# Patient Record
Sex: FEMALE | NOT HISPANIC OR LATINO | Employment: UNEMPLOYED | URBAN - METROPOLITAN AREA
[De-identification: names, ages, dates, MRNs, and addresses within clinical notes are randomized per-mention and may not be internally consistent; named-entity substitution may affect disease eponyms.]

---

## 2020-01-01 ENCOUNTER — APPOINTMENT (OUTPATIENT)
Dept: CT IMAGING | Facility: HOSPITAL | Age: 55
End: 2020-01-01

## 2020-01-01 ENCOUNTER — ANESTHESIA EVENT (OUTPATIENT)
Dept: PERIOP | Facility: HOSPITAL | Age: 55
End: 2020-01-01

## 2020-01-01 ENCOUNTER — ANESTHESIA (OUTPATIENT)
Dept: PERIOP | Facility: HOSPITAL | Age: 55
End: 2020-01-01

## 2020-01-01 ENCOUNTER — APPOINTMENT (OUTPATIENT)
Dept: GENERAL RADIOLOGY | Facility: HOSPITAL | Age: 55
End: 2020-01-01

## 2020-01-01 ENCOUNTER — HOSPITAL ENCOUNTER (INPATIENT)
Facility: HOSPITAL | Age: 55
LOS: 2 days | End: 2020-04-03
Attending: EMERGENCY MEDICINE | Admitting: INTERNAL MEDICINE

## 2020-01-01 VITALS
WEIGHT: 140.2 LBS | BODY MASS INDEX: 26.47 KG/M2 | DIASTOLIC BLOOD PRESSURE: 60 MMHG | RESPIRATION RATE: 12 BRPM | OXYGEN SATURATION: 96 % | SYSTOLIC BLOOD PRESSURE: 96 MMHG | HEIGHT: 61 IN | TEMPERATURE: 98.5 F

## 2020-01-01 DIAGNOSIS — I60.9 SAH (SUBARACHNOID HEMORRHAGE) (HCC): ICD-10-CM

## 2020-01-01 DIAGNOSIS — I60.7 CEREBRAL ANEURYSM RUPTURE (HCC): ICD-10-CM

## 2020-01-01 DIAGNOSIS — I60.9 SAH (SUBARACHNOID HEMORRHAGE) (HCC): Primary | ICD-10-CM

## 2020-01-01 DIAGNOSIS — I60.9 ACUTE SPONTANEOUS SUBARACHNOID INTRACRANIAL HEMORRHAGE (HCC): Primary | ICD-10-CM

## 2020-01-01 DIAGNOSIS — I10 HYPERTENSION, UNSPECIFIED TYPE: ICD-10-CM

## 2020-01-01 LAB
ABO GROUP BLD: NORMAL
ALBUMIN SERPL-MCNC: 4.8 G/DL (ref 3.5–5.2)
ALBUMIN/GLOB SERPL: 1.3 G/DL
ALP SERPL-CCNC: 95 U/L (ref 39–117)
ALT SERPL W P-5'-P-CCNC: 36 U/L (ref 1–33)
ANION GAP SERPL CALCULATED.3IONS-SCNC: 11.6 MMOL/L (ref 5–15)
ANION GAP SERPL CALCULATED.3IONS-SCNC: 14.1 MMOL/L (ref 5–15)
ANION GAP SERPL CALCULATED.3IONS-SCNC: 14.9 MMOL/L (ref 5–15)
ANION GAP SERPL CALCULATED.3IONS-SCNC: 9.2 MMOL/L (ref 5–15)
ANION GAP SERPL CALCULATED.3IONS-SCNC: ABNORMAL MMOL/L
APPEARANCE CSF: ABNORMAL
APTT PPP: 25.1 SECONDS (ref 22.7–35.4)
ARTERIAL PATENCY WRIST A: POSITIVE
ARTERIAL PATENCY WRIST A: POSITIVE
AST SERPL-CCNC: 33 U/L (ref 1–32)
ATMOSPHERIC PRESS: 752.2 MMHG
ATMOSPHERIC PRESS: 752.4 MMHG
BASE EXCESS BLDA CALC-SCNC: -0.6 MMOL/L (ref 0–2)
BASE EXCESS BLDA CALC-SCNC: 0.6 MMOL/L (ref 0–2)
BDY SITE: ABNORMAL
BDY SITE: ABNORMAL
BILIRUB SERPL-MCNC: 0.3 MG/DL (ref 0.2–1.2)
BLD GP AB SCN SERPL QL: NEGATIVE
BUN BLD-MCNC: 10 MG/DL (ref 6–20)
BUN BLD-MCNC: 12 MG/DL (ref 6–20)
BUN BLD-MCNC: 12 MG/DL (ref 6–20)
BUN BLD-MCNC: 16 MG/DL (ref 6–20)
BUN BLD-MCNC: 7 MG/DL (ref 6–20)
BUN BLD-MCNC: 8 MG/DL (ref 6–20)
BUN BLD-MCNC: 9 MG/DL (ref 6–20)
BUN/CREAT SERPL: 10.4 (ref 7–25)
BUN/CREAT SERPL: 10.8 (ref 7–25)
BUN/CREAT SERPL: 12.2 (ref 7–25)
BUN/CREAT SERPL: 12.5 (ref 7–25)
BUN/CREAT SERPL: 14 (ref 7–25)
BUN/CREAT SERPL: 19.2 (ref 7–25)
BUN/CREAT SERPL: 28.6 (ref 7–25)
C GATTII+NEOFOR DNA CSF QL NAA+NON-PROBE: NOT DETECTED
CALCIUM SPEC-SCNC: 10.4 MG/DL (ref 8.6–10.5)
CALCIUM SPEC-SCNC: 10.4 MG/DL (ref 8.6–10.5)
CALCIUM SPEC-SCNC: 10.6 MG/DL (ref 8.6–10.5)
CALCIUM SPEC-SCNC: 8.4 MG/DL (ref 8.6–10.5)
CALCIUM SPEC-SCNC: 8.5 MG/DL (ref 8.6–10.5)
CALCIUM SPEC-SCNC: 8.9 MG/DL (ref 8.6–10.5)
CALCIUM SPEC-SCNC: 9.5 MG/DL (ref 8.6–10.5)
CHLORIDE SERPL-SCNC: 105 MMOL/L (ref 98–107)
CHLORIDE SERPL-SCNC: 126 MMOL/L (ref 98–107)
CHLORIDE SERPL-SCNC: 95 MMOL/L (ref 98–107)
CHLORIDE SERPL-SCNC: 99 MMOL/L (ref 98–107)
CHLORIDE SERPL-SCNC: >140 MMOL/L (ref 98–107)
CMV DNA CSF QL NAA+PROBE: NOT DETECTED
CO2 SERPL-SCNC: 14.5 MMOL/L (ref 22–29)
CO2 SERPL-SCNC: 15.5 MMOL/L (ref 22–29)
CO2 SERPL-SCNC: 15.8 MMOL/L (ref 22–29)
CO2 SERPL-SCNC: 17.4 MMOL/L (ref 22–29)
CO2 SERPL-SCNC: 18.8 MMOL/L (ref 22–29)
CO2 SERPL-SCNC: 22.9 MMOL/L (ref 22–29)
CO2 SERPL-SCNC: 23.1 MMOL/L (ref 22–29)
COLOR CSF: ABNORMAL
COLOR SPUN CSF: ABNORMAL
CREAT BLD-MCNC: 0.52 MG/DL (ref 0.57–1)
CREAT BLD-MCNC: 0.56 MG/DL (ref 0.57–1)
CREAT BLD-MCNC: 0.57 MG/DL (ref 0.57–1)
CREAT BLD-MCNC: 0.65 MG/DL (ref 0.57–1)
CREAT BLD-MCNC: 0.72 MG/DL (ref 0.57–1)
CREAT BLD-MCNC: 0.98 MG/DL (ref 0.57–1)
CREAT BLD-MCNC: 1.15 MG/DL (ref 0.57–1)
DEPRECATED RDW RBC AUTO: 38.2 FL (ref 37–54)
DEPRECATED RDW RBC AUTO: 38.6 FL (ref 37–54)
E COLI K1 DNA CSF QL NAA+NON-PROBE: NOT DETECTED
EOSINOPHIL # BLD MANUAL: 0.12 10*3/MM3 (ref 0–0.4)
EOSINOPHIL NFR BLD MANUAL: 1 % (ref 0.3–6.2)
ERYTHROCYTE [DISTWIDTH] IN BLOOD BY AUTOMATED COUNT: 12 % (ref 12.3–15.4)
ERYTHROCYTE [DISTWIDTH] IN BLOOD BY AUTOMATED COUNT: 12 % (ref 12.3–15.4)
EV RNA CSF QL NAA+PROBE: NOT DETECTED
GFR SERPL CREATININE-BSD FRML MDRD: 103 ML/MIN/1.73
GFR SERPL CREATININE-BSD FRML MDRD: 111 ML/MIN/1.73
GFR SERPL CREATININE-BSD FRML MDRD: 114 ML/MIN/1.73
GFR SERPL CREATININE-BSD FRML MDRD: 116 ML/MIN/1.73
GFR SERPL CREATININE-BSD FRML MDRD: 124 ML/MIN/1.73
GFR SERPL CREATININE-BSD FRML MDRD: 135 ML/MIN/1.73
GFR SERPL CREATININE-BSD FRML MDRD: 138 ML/MIN/1.73
GFR SERPL CREATININE-BSD FRML MDRD: 150 ML/MIN/1.73
GFR SERPL CREATININE-BSD FRML MDRD: 50 ML/MIN/1.73
GFR SERPL CREATININE-BSD FRML MDRD: 60 ML/MIN/1.73
GFR SERPL CREATININE-BSD FRML MDRD: 60 ML/MIN/1.73
GFR SERPL CREATININE-BSD FRML MDRD: 72 ML/MIN/1.73
GFR SERPL CREATININE-BSD FRML MDRD: 85 ML/MIN/1.73
GFR SERPL CREATININE-BSD FRML MDRD: 96 ML/MIN/1.73
GLOBULIN UR ELPH-MCNC: 3.8 GM/DL
GLUCOSE BLD-MCNC: 127 MG/DL (ref 65–99)
GLUCOSE BLD-MCNC: 129 MG/DL (ref 65–99)
GLUCOSE BLD-MCNC: 133 MG/DL (ref 65–99)
GLUCOSE BLD-MCNC: 137 MG/DL (ref 65–99)
GLUCOSE BLD-MCNC: 143 MG/DL (ref 65–99)
GLUCOSE BLD-MCNC: 157 MG/DL (ref 65–99)
GLUCOSE BLD-MCNC: 217 MG/DL (ref 65–99)
GLUCOSE BLDC GLUCOMTR-MCNC: 120 MG/DL (ref 70–130)
GLUCOSE BLDC GLUCOMTR-MCNC: 148 MG/DL (ref 70–130)
GLUCOSE BLDC GLUCOMTR-MCNC: 149 MG/DL (ref 70–130)
GLUCOSE BLDC GLUCOMTR-MCNC: 151 MG/DL (ref 70–130)
GLUCOSE CSF-MCNC: 33 MG/DL (ref 40–70)
GP B STREP DNA SPEC QL NAA+PROBE: NOT DETECTED
HAEM INFLU SEROTYP DNA SPEC NAA+PROBE: NOT DETECTED
HCO3 BLDA-SCNC: 24.4 MMOL/L (ref 22–28)
HCO3 BLDA-SCNC: 24.9 MMOL/L (ref 22–28)
HCT VFR BLD AUTO: 34.3 % (ref 34–46.6)
HCT VFR BLD AUTO: 35 % (ref 34–46.6)
HGB BLD-MCNC: 11.5 G/DL (ref 12–15.9)
HGB BLD-MCNC: 11.6 G/DL (ref 12–15.9)
HHV6 DNA CSF QL NAA+PROBE: NOT DETECTED
HOLD SPECIMEN: NORMAL
HOLD SPECIMEN: NORMAL
HOROWITZ INDEX BLD+IHG-RTO: 40 %
HOROWITZ INDEX BLD+IHG-RTO: 60 %
HSV1 DNA CSF QL NAA+PROBE: NOT DETECTED
HSV2 DNA CSF QL NAA+PROBE: NOT DETECTED
INR PPP: 0.93 (ref 0.9–1.1)
L MONOCYTOG RRNA SPEC QL PROBE: NOT DETECTED
LYMPHOCYTES # BLD MANUAL: 6.14 10*3/MM3 (ref 0.7–3.1)
LYMPHOCYTES NFR BLD MANUAL: 2 % (ref 5–12)
LYMPHOCYTES NFR BLD MANUAL: 52 % (ref 19.6–45.3)
LYMPHOCYTES NFR CSF MANUAL: 23 %
MAGNESIUM SERPL-MCNC: 1.8 MG/DL (ref 1.6–2.6)
MCH RBC QN AUTO: 29.1 PG (ref 26.6–33)
MCH RBC QN AUTO: 29.2 PG (ref 26.6–33)
MCHC RBC AUTO-ENTMCNC: 32.9 G/DL (ref 31.5–35.7)
MCHC RBC AUTO-ENTMCNC: 33.8 G/DL (ref 31.5–35.7)
MCV RBC AUTO: 86.4 FL (ref 79–97)
MCV RBC AUTO: 88.6 FL (ref 79–97)
METHOD: ABNORMAL
MODALITY: ABNORMAL
MODALITY: ABNORMAL
MONOCYTES # BLD AUTO: 0.24 10*3/MM3 (ref 0.1–0.9)
MONOCYTES NFR CSF MANUAL: 6 %
N MEN DNA SPEC QL NAA+PROBE: NOT DETECTED
NEUTROPHILS # BLD AUTO: 5.3 10*3/MM3 (ref 1.7–7)
NEUTROPHILS NFR BLD MANUAL: 44.9 % (ref 42.7–76)
NEUTROPHILS NFR CSF MICRO: 71 %
NUC CELL # CSF MANUAL: 120 /MM3 (ref 0–5)
O2 A-A PPRESDIFF RESPIRATORY: 0.7 MMHG
O2 A-A PPRESDIFF RESPIRATORY: 0.7 MMHG
OSMOLALITY SERPL: 372 MOSM/KG (ref 275–300)
OSMOLALITY UR: 195 MOSM/KG
PARECHOVIRUS A RNA CSF QL NAA+NON-PROBE: NOT DETECTED
PCO2 BLDA: 38 MM HG (ref 35–45)
PCO2 BLDA: 40.6 MM HG (ref 35–45)
PEEP RESPIRATORY: 5 CM[H2O]
PEEP RESPIRATORY: 5 CM[H2O]
PH BLDA: 7.39 PH UNITS (ref 7.35–7.45)
PH BLDA: 7.42 PH UNITS (ref 7.35–7.45)
PHOSPHATE SERPL-MCNC: 3.1 MG/DL (ref 2.5–4.5)
PLAT MORPH BLD: NORMAL
PLATELET # BLD AUTO: 200 10*3/MM3 (ref 140–450)
PLATELET # BLD AUTO: 272 10*3/MM3 (ref 140–450)
PMV BLD AUTO: 10.4 FL (ref 6–12)
PMV BLD AUTO: 11.3 FL (ref 6–12)
PO2 BLDA: 175.6 MM HG (ref 80–100)
PO2 BLDA: 281 MM HG (ref 80–100)
POTASSIUM BLD-SCNC: 2.4 MMOL/L (ref 3.5–5.2)
POTASSIUM BLD-SCNC: 2.6 MMOL/L (ref 3.5–5.2)
POTASSIUM BLD-SCNC: 2.7 MMOL/L (ref 3.5–5.2)
POTASSIUM BLD-SCNC: 3.3 MMOL/L (ref 3.5–5.2)
POTASSIUM BLD-SCNC: 3.4 MMOL/L (ref 3.5–5.2)
POTASSIUM BLD-SCNC: 3.5 MMOL/L (ref 3.5–5.2)
POTASSIUM BLD-SCNC: 4.6 MMOL/L (ref 3.5–5.2)
PROT CSF-MCNC: >600 MG/DL (ref 15–45)
PROT SERPL-MCNC: 8.6 G/DL (ref 6–8.5)
PROTHROMBIN TIME: 12.2 SECONDS (ref 11.7–14.2)
RBC # BLD AUTO: 3.95 10*6/MM3 (ref 3.77–5.28)
RBC # BLD AUTO: 3.97 10*6/MM3 (ref 3.77–5.28)
RBC # CSF MANUAL: ABNORMAL /MM3 (ref 0–0)
RBC MORPH BLD: NORMAL
RH BLD: NEGATIVE
S PNEUM DNA CSF QL NAA+NON-PROBE: NOT DETECTED
SAO2 % BLDCOA: 99.6 % (ref 92–99)
SAO2 % BLDCOA: 99.9 % (ref 92–99)
SET MECH RESP RATE: 10
SET MECH RESP RATE: 18
SMUDGE CELLS BLD QL SMEAR: ABNORMAL
SODIUM BLD-SCNC: 132 MMOL/L (ref 136–145)
SODIUM BLD-SCNC: 134 MMOL/L (ref 136–145)
SODIUM BLD-SCNC: 137 MMOL/L (ref 136–145)
SODIUM BLD-SCNC: 154 MMOL/L (ref 136–145)
SODIUM BLD-SCNC: 170 MMOL/L (ref 136–145)
SODIUM BLD-SCNC: 170 MMOL/L (ref 136–145)
SODIUM BLD-SCNC: 173 MMOL/L (ref 136–145)
SODIUM UR-SCNC: <20 MMOL/L
T&S EXPIRATION DATE: NORMAL
TOTAL RATE: 13 BREATHS/MINUTE
TOTAL RATE: 21 BREATHS/MINUTE
TROPONIN T SERPL-MCNC: <0.01 NG/ML (ref 0–0.03)
TUBE # CSF: ABNORMAL
VENTILATOR MODE: ABNORMAL
VENTILATOR MODE: AC
VT ON VENT VENT: 500 ML
VT ON VENT VENT: 500 ML
VZV DNA CSF QL NAA+PROBE: NOT DETECTED
WBC NRBC COR # BLD: 11.81 10*3/MM3 (ref 3.4–10.8)
WBC NRBC COR # BLD: 13.25 10*3/MM3 (ref 3.4–10.8)
WHOLE BLOOD HOLD SPECIMEN: NORMAL
WHOLE BLOOD HOLD SPECIMEN: NORMAL

## 2020-01-01 PROCEDURE — 99232 SBSQ HOSP IP/OBS MODERATE 35: CPT | Performed by: NURSE PRACTITIONER

## 2020-01-01 PROCEDURE — 25010000002 FENTANYL CITRATE (PF) 100 MCG/2ML SOLUTION: Performed by: INTERNAL MEDICINE

## 2020-01-01 PROCEDURE — 25010000002 HEPARIN (PORCINE) PER 1000 UNITS: Performed by: RADIOLOGY

## 2020-01-01 PROCEDURE — 94799 UNLISTED PULMONARY SVC/PX: CPT

## 2020-01-01 PROCEDURE — 87015 SPECIMEN INFECT AGNT CONCNTJ: CPT | Performed by: NEUROLOGICAL SURGERY

## 2020-01-01 PROCEDURE — 85027 COMPLETE CBC AUTOMATED: CPT | Performed by: INTERNAL MEDICINE

## 2020-01-01 PROCEDURE — 85610 PROTHROMBIN TIME: CPT | Performed by: EMERGENCY MEDICINE

## 2020-01-01 PROCEDURE — 84484 ASSAY OF TROPONIN QUANT: CPT | Performed by: EMERGENCY MEDICINE

## 2020-01-01 PROCEDURE — 70450 CT HEAD/BRAIN W/O DYE: CPT

## 2020-01-01 PROCEDURE — 85730 THROMBOPLASTIN TIME PARTIAL: CPT | Performed by: EMERGENCY MEDICINE

## 2020-01-01 PROCEDURE — 80048 BASIC METABOLIC PNL TOTAL CA: CPT | Performed by: INTERNAL MEDICINE

## 2020-01-01 PROCEDURE — 89051 BODY FLUID CELL COUNT: CPT | Performed by: NEUROLOGICAL SURGERY

## 2020-01-01 PROCEDURE — 82962 GLUCOSE BLOOD TEST: CPT

## 2020-01-01 PROCEDURE — 74018 RADEX ABDOMEN 1 VIEW: CPT

## 2020-01-01 PROCEDURE — 009600Z DRAINAGE OF CEREBRAL VENTRICLE WITH DRAINAGE DEVICE, OPEN APPROACH: ICD-10-PCS | Performed by: NEUROLOGICAL SURGERY

## 2020-01-01 PROCEDURE — 25010000002 LEVETIRACETAM IN NACL 0.82% 500 MG/100ML SOLUTION: Performed by: NEUROLOGICAL SURGERY

## 2020-01-01 PROCEDURE — 0 IODIXANOL PER 1 ML: Performed by: RADIOLOGY

## 2020-01-01 PROCEDURE — 25010000002 SUCCINYLCHOLINE PER 20 MG: Performed by: EMERGENCY MEDICINE

## 2020-01-01 PROCEDURE — 80053 COMPREHEN METABOLIC PANEL: CPT | Performed by: EMERGENCY MEDICINE

## 2020-01-01 PROCEDURE — C9248 INJ, CLEVIDIPINE BUTYRATE: HCPCS | Performed by: ANESTHESIOLOGY

## 2020-01-01 PROCEDURE — 93005 ELECTROCARDIOGRAM TRACING: CPT | Performed by: EMERGENCY MEDICINE

## 2020-01-01 PROCEDURE — 76377 3D RENDER W/INTRP POSTPROCES: CPT

## 2020-01-01 PROCEDURE — 86901 BLOOD TYPING SEROLOGIC RH(D): CPT | Performed by: EMERGENCY MEDICINE

## 2020-01-01 PROCEDURE — 84300 ASSAY OF URINE SODIUM: CPT | Performed by: INTERNAL MEDICINE

## 2020-01-01 PROCEDURE — 71045 X-RAY EXAM CHEST 1 VIEW: CPT

## 2020-01-01 PROCEDURE — 87483 CNS DNA AMP PROBE TYPE 12-25: CPT | Performed by: NEUROLOGICAL SURGERY

## 2020-01-01 PROCEDURE — 36600 WITHDRAWAL OF ARTERIAL BLOOD: CPT

## 2020-01-01 PROCEDURE — C1729 CATH, DRAINAGE: HCPCS | Performed by: NEUROLOGICAL SURGERY

## 2020-01-01 PROCEDURE — 25810000003 SODIUM CHLORIDE 0.9 % WITH KCL 20 MEQ 20-0.9 MEQ/L-% SOLUTION: Performed by: NEUROLOGICAL SURGERY

## 2020-01-01 PROCEDURE — 03VG3DZ RESTRICTION OF INTRACRANIAL ARTERY WITH INTRALUMINAL DEVICE, PERCUTANEOUS APPROACH: ICD-10-PCS | Performed by: RADIOLOGY

## 2020-01-01 PROCEDURE — 82945 GLUCOSE OTHER FLUID: CPT | Performed by: NEUROLOGICAL SURGERY

## 2020-01-01 PROCEDURE — 80048 BASIC METABOLIC PNL TOTAL CA: CPT | Performed by: NEUROLOGICAL SURGERY

## 2020-01-01 PROCEDURE — 31500 INSERT EMERGENCY AIRWAY: CPT

## 2020-01-01 PROCEDURE — 61107 TDH PNXR IMPLT VENTR CATH: CPT | Performed by: NEUROLOGICAL SURGERY

## 2020-01-01 PROCEDURE — 25010000002 FENTANYL CITRATE (PF) 100 MCG/2ML SOLUTION

## 2020-01-01 PROCEDURE — 83735 ASSAY OF MAGNESIUM: CPT | Performed by: INTERNAL MEDICINE

## 2020-01-01 PROCEDURE — 25010000002 MIDAZOLAM PER 1 MG: Performed by: EMERGENCY MEDICINE

## 2020-01-01 PROCEDURE — 85025 COMPLETE CBC W/AUTO DIFF WBC: CPT | Performed by: EMERGENCY MEDICINE

## 2020-01-01 PROCEDURE — 84100 ASSAY OF PHOSPHORUS: CPT | Performed by: INTERNAL MEDICINE

## 2020-01-01 PROCEDURE — 25010000002 PROPOFOL 10 MG/ML EMULSION: Performed by: ANESTHESIOLOGY

## 2020-01-01 PROCEDURE — C1894 INTRO/SHEATH, NON-LASER: HCPCS | Performed by: RADIOLOGY

## 2020-01-01 PROCEDURE — C1769 GUIDE WIRE: HCPCS | Performed by: RADIOLOGY

## 2020-01-01 PROCEDURE — 94003 VENT MGMT INPAT SUBQ DAY: CPT

## 2020-01-01 PROCEDURE — 25010000003 CLEVIDIPINE BUTYRATE PER 1 MG: Performed by: ANESTHESIOLOGY

## 2020-01-01 PROCEDURE — C2628 CATHETER, OCCLUSION: HCPCS | Performed by: RADIOLOGY

## 2020-01-01 PROCEDURE — 25010000003 LEVETIRACETAM IN NACL 0.75% 1000 MG/100ML SOLUTION: Performed by: EMERGENCY MEDICINE

## 2020-01-01 PROCEDURE — 84157 ASSAY OF PROTEIN OTHER: CPT | Performed by: NEUROLOGICAL SURGERY

## 2020-01-01 PROCEDURE — 87070 CULTURE OTHR SPECIMN AEROBIC: CPT | Performed by: NEUROLOGICAL SURGERY

## 2020-01-01 PROCEDURE — 82803 BLOOD GASES ANY COMBINATION: CPT

## 2020-01-01 PROCEDURE — 99291 CRITICAL CARE FIRST HOUR: CPT

## 2020-01-01 PROCEDURE — 25010000002 PHENYLEPHRINE PER 1 ML: Performed by: ANESTHESIOLOGY

## 2020-01-01 PROCEDURE — 75894 X-RAYS TRANSCATH THERAPY: CPT

## 2020-01-01 PROCEDURE — 93010 ELECTROCARDIOGRAM REPORT: CPT | Performed by: INTERNAL MEDICINE

## 2020-01-01 PROCEDURE — 99221 1ST HOSP IP/OBS SF/LOW 40: CPT | Performed by: NEUROLOGICAL SURGERY

## 2020-01-01 PROCEDURE — 25010000002 LEVETIRACETAM IN NACL 0.82% 500 MG/100ML SOLUTION: Performed by: RADIOLOGY

## 2020-01-01 PROCEDURE — 70496 CT ANGIOGRAPHY HEAD: CPT

## 2020-01-01 PROCEDURE — 0 IOPAMIDOL PER 1 ML: Performed by: EMERGENCY MEDICINE

## 2020-01-01 PROCEDURE — 25010000002 PROPOFOL 10 MG/ML EMULSION: Performed by: EMERGENCY MEDICINE

## 2020-01-01 PROCEDURE — 83930 ASSAY OF BLOOD OSMOLALITY: CPT | Performed by: INTERNAL MEDICINE

## 2020-01-01 PROCEDURE — 75898 FOLLOW-UP ANGIOGRAPHY: CPT

## 2020-01-01 PROCEDURE — 93886 INTRACRANIAL COMPLETE STUDY: CPT

## 2020-01-01 PROCEDURE — 85007 BL SMEAR W/DIFF WBC COUNT: CPT | Performed by: EMERGENCY MEDICINE

## 2020-01-01 PROCEDURE — C1887 CATHETER, GUIDING: HCPCS | Performed by: RADIOLOGY

## 2020-01-01 PROCEDURE — 86850 RBC ANTIBODY SCREEN: CPT | Performed by: EMERGENCY MEDICINE

## 2020-01-01 PROCEDURE — 86900 BLOOD TYPING SEROLOGIC ABO: CPT | Performed by: EMERGENCY MEDICINE

## 2020-01-01 PROCEDURE — 87205 SMEAR GRAM STAIN: CPT | Performed by: NEUROLOGICAL SURGERY

## 2020-01-01 PROCEDURE — C1725 CATH, TRANSLUMIN NON-LASER: HCPCS | Performed by: RADIOLOGY

## 2020-01-01 PROCEDURE — 94002 VENT MGMT INPAT INIT DAY: CPT

## 2020-01-01 PROCEDURE — 83935 ASSAY OF URINE OSMOLALITY: CPT | Performed by: INTERNAL MEDICINE

## 2020-01-01 PROCEDURE — 82565 ASSAY OF CREATININE: CPT

## 2020-01-01 PROCEDURE — 25010000002 VANCOMYCIN 1 G RECONSTITUTED SOLUTION 1 EACH VIAL: Performed by: NEUROLOGICAL SURGERY

## 2020-01-01 PROCEDURE — 25010000002 PHENYLEPHRINE 10 MG/ML SOLUTION 5 ML VIAL: Performed by: INTERNAL MEDICINE

## 2020-01-01 DEVICE — IMPLANTABLE DEVICE: Type: IMPLANTABLE DEVICE | Site: CEREBRUM | Status: FUNCTIONAL

## 2020-01-01 RX ORDER — EPHEDRINE SULFATE 50 MG/ML
INJECTION, SOLUTION INTRAVENOUS AS NEEDED
Status: DISCONTINUED | OUTPATIENT
Start: 2020-01-01 | End: 2020-01-01 | Stop reason: SURG

## 2020-01-01 RX ORDER — SODIUM CHLORIDE AND POTASSIUM CHLORIDE 150; 900 MG/100ML; MG/100ML
60 INJECTION, SOLUTION INTRAVENOUS CONTINUOUS
Status: DISCONTINUED | OUTPATIENT
Start: 2020-01-01 | End: 2020-01-01

## 2020-01-01 RX ORDER — NIMODIPINE 30 MG/1
60 CAPSULE, LIQUID FILLED ORAL
Status: DISCONTINUED | OUTPATIENT
Start: 2020-01-01 | End: 2020-01-01 | Stop reason: CLARIF

## 2020-01-01 RX ORDER — FENTANYL CITRATE 50 UG/ML
INJECTION, SOLUTION INTRAMUSCULAR; INTRAVENOUS
Status: COMPLETED
Start: 2020-01-01 | End: 2020-01-01

## 2020-01-01 RX ORDER — ONDANSETRON 2 MG/ML
4 INJECTION INTRAMUSCULAR; INTRAVENOUS EVERY 6 HOURS PRN
Status: DISCONTINUED | OUTPATIENT
Start: 2020-01-01 | End: 2020-04-04 | Stop reason: HOSPADM

## 2020-01-01 RX ORDER — ROCURONIUM BROMIDE 10 MG/ML
INJECTION, SOLUTION INTRAVENOUS AS NEEDED
Status: DISCONTINUED | OUTPATIENT
Start: 2020-01-01 | End: 2020-01-01 | Stop reason: SURG

## 2020-01-01 RX ORDER — MANNITOL 20 G/100ML
INJECTION, SOLUTION INTRAVENOUS CONTINUOUS PRN
Status: DISCONTINUED | OUTPATIENT
Start: 2020-01-01 | End: 2020-01-01 | Stop reason: SURG

## 2020-01-01 RX ORDER — MAGNESIUM SULFATE 1 G/100ML
1 INJECTION INTRAVENOUS AS NEEDED
Status: DISCONTINUED | OUTPATIENT
Start: 2020-01-01 | End: 2020-04-04 | Stop reason: HOSPADM

## 2020-01-01 RX ORDER — LEVETIRACETAM 10 MG/ML
1000 INJECTION INTRAVASCULAR ONCE
Status: COMPLETED | OUTPATIENT
Start: 2020-01-01 | End: 2020-01-01

## 2020-01-01 RX ORDER — PROMETHAZINE HYDROCHLORIDE 25 MG/ML
12.5 INJECTION, SOLUTION INTRAMUSCULAR; INTRAVENOUS ONCE AS NEEDED
Status: DISCONTINUED | OUTPATIENT
Start: 2020-01-01 | End: 2020-01-01 | Stop reason: HOSPADM

## 2020-01-01 RX ORDER — PROPOFOL 10 MG/ML
VIAL (ML) INTRAVENOUS AS NEEDED
Status: DISCONTINUED | OUTPATIENT
Start: 2020-01-01 | End: 2020-01-01 | Stop reason: SURG

## 2020-01-01 RX ORDER — PROMETHAZINE HYDROCHLORIDE 25 MG/1
25 TABLET ORAL ONCE AS NEEDED
Status: DISCONTINUED | OUTPATIENT
Start: 2020-01-01 | End: 2020-01-01 | Stop reason: HOSPADM

## 2020-01-01 RX ORDER — FAMOTIDINE 10 MG/ML
20 INJECTION, SOLUTION INTRAVENOUS EVERY 12 HOURS SCHEDULED
Status: DISCONTINUED | OUTPATIENT
Start: 2020-01-01 | End: 2020-04-04 | Stop reason: HOSPADM

## 2020-01-01 RX ORDER — SODIUM CHLORIDE 9 MG/ML
100 INJECTION, SOLUTION INTRAVENOUS CONTINUOUS
Status: DISCONTINUED | OUTPATIENT
Start: 2020-01-01 | End: 2020-01-01

## 2020-01-01 RX ORDER — HYDROCODONE BITARTRATE AND ACETAMINOPHEN 7.5; 325 MG/1; MG/1
1 TABLET ORAL ONCE AS NEEDED
Status: DISCONTINUED | OUTPATIENT
Start: 2020-01-01 | End: 2020-01-01 | Stop reason: HOSPADM

## 2020-01-01 RX ORDER — HYDROMORPHONE HYDROCHLORIDE 1 MG/ML
0.5 INJECTION, SOLUTION INTRAMUSCULAR; INTRAVENOUS; SUBCUTANEOUS
Status: DISCONTINUED | OUTPATIENT
Start: 2020-01-01 | End: 2020-01-01 | Stop reason: HOSPADM

## 2020-01-01 RX ORDER — NEOMYCIN SULFATE, POLYMYXIN B SULFATE AND BACITRACIN ZINC 3.5; 10000; 4 MG/G; [USP'U]/G; [USP'U]/G
OINTMENT OPHTHALMIC AS NEEDED
Status: DISCONTINUED | OUTPATIENT
Start: 2020-01-01 | End: 2020-01-01 | Stop reason: HOSPADM

## 2020-01-01 RX ORDER — MAGNESIUM SULFATE HEPTAHYDRATE 40 MG/ML
4 INJECTION, SOLUTION INTRAVENOUS AS NEEDED
Status: DISCONTINUED | OUTPATIENT
Start: 2020-01-01 | End: 2020-04-04 | Stop reason: HOSPADM

## 2020-01-01 RX ORDER — SODIUM CHLORIDE, SODIUM ACETATE ANHYDROUS, SODIUM GLUCONATE, POTASSIUM CHLORIDE, AND MAGNESIUM CHLORIDE 526; 222; 502; 37; 30 MG/100ML; MG/100ML; MG/100ML; MG/100ML; MG/100ML
IRRIGANT IRRIGATION AS NEEDED
Status: DISCONTINUED | OUTPATIENT
Start: 2020-01-01 | End: 2020-01-01 | Stop reason: HOSPADM

## 2020-01-01 RX ORDER — SUCCINYLCHOLINE CHLORIDE 20 MG/ML
1.5 INJECTION INTRAMUSCULAR; INTRAVENOUS ONCE
Status: DISCONTINUED | OUTPATIENT
Start: 2020-01-01 | End: 2020-01-01

## 2020-01-01 RX ORDER — ACETAMINOPHEN 325 MG/1
650 TABLET ORAL ONCE AS NEEDED
Status: DISCONTINUED | OUTPATIENT
Start: 2020-01-01 | End: 2020-01-01 | Stop reason: HOSPADM

## 2020-01-01 RX ORDER — FLUMAZENIL 0.1 MG/ML
0.2 INJECTION INTRAVENOUS AS NEEDED
Status: DISCONTINUED | OUTPATIENT
Start: 2020-01-01 | End: 2020-01-01 | Stop reason: HOSPADM

## 2020-01-01 RX ORDER — LEVETIRACETAM 5 MG/ML
500 INJECTION INTRAVASCULAR EVERY 12 HOURS SCHEDULED
Status: DISCONTINUED | OUTPATIENT
Start: 2020-01-01 | End: 2020-04-04 | Stop reason: HOSPADM

## 2020-01-01 RX ORDER — NOREPINEPHRINE BIT/0.9 % NACL 8 MG/250ML
.02-.3 INFUSION BOTTLE (ML) INTRAVENOUS
Status: DISCONTINUED | OUTPATIENT
Start: 2020-01-01 | End: 2020-04-04 | Stop reason: HOSPADM

## 2020-01-01 RX ORDER — POTASSIUM CHLORIDE 7.45 MG/ML
10 INJECTION INTRAVENOUS
Status: DISCONTINUED | OUTPATIENT
Start: 2020-01-01 | End: 2020-04-04 | Stop reason: HOSPADM

## 2020-01-01 RX ORDER — DIPHENHYDRAMINE HCL 25 MG
25 CAPSULE ORAL
Status: DISCONTINUED | OUTPATIENT
Start: 2020-01-01 | End: 2020-01-01 | Stop reason: HOSPADM

## 2020-01-01 RX ORDER — HYDRALAZINE HYDROCHLORIDE 20 MG/ML
5 INJECTION INTRAMUSCULAR; INTRAVENOUS
Status: DISCONTINUED | OUTPATIENT
Start: 2020-01-01 | End: 2020-01-01 | Stop reason: HOSPADM

## 2020-01-01 RX ORDER — POTASSIUM CHLORIDE 1.5 G/1.77G
40 POWDER, FOR SOLUTION ORAL AS NEEDED
Status: DISCONTINUED | OUTPATIENT
Start: 2020-01-01 | End: 2020-04-04 | Stop reason: HOSPADM

## 2020-01-01 RX ORDER — NALOXONE HCL 0.4 MG/ML
0.2 VIAL (ML) INJECTION AS NEEDED
Status: DISCONTINUED | OUTPATIENT
Start: 2020-01-01 | End: 2020-01-01 | Stop reason: HOSPADM

## 2020-01-01 RX ORDER — SODIUM CHLORIDE 9 MG/ML
100 INJECTION, SOLUTION INTRAVENOUS CONTINUOUS
Status: CANCELLED | OUTPATIENT
Start: 2020-01-01

## 2020-01-01 RX ORDER — SUCCINYLCHOLINE CHLORIDE 20 MG/ML
100 INJECTION INTRAMUSCULAR; INTRAVENOUS ONCE
Status: DISCONTINUED | OUTPATIENT
Start: 2020-01-01 | End: 2020-04-04 | Stop reason: HOSPADM

## 2020-01-01 RX ORDER — ONDANSETRON 2 MG/ML
4 INJECTION INTRAMUSCULAR; INTRAVENOUS ONCE AS NEEDED
Status: DISCONTINUED | OUTPATIENT
Start: 2020-01-01 | End: 2020-01-01 | Stop reason: HOSPADM

## 2020-01-01 RX ORDER — MIDAZOLAM HYDROCHLORIDE 1 MG/ML
2 INJECTION INTRAMUSCULAR; INTRAVENOUS ONCE
Status: COMPLETED | OUTPATIENT
Start: 2020-01-01 | End: 2020-01-01

## 2020-01-01 RX ORDER — ONDANSETRON 2 MG/ML
4 INJECTION INTRAMUSCULAR; INTRAVENOUS ONCE
Status: DISCONTINUED | OUTPATIENT
Start: 2020-01-01 | End: 2020-04-04 | Stop reason: HOSPADM

## 2020-01-01 RX ORDER — ETOMIDATE 2 MG/ML
0.3 INJECTION INTRAVENOUS ONCE
Status: COMPLETED | OUTPATIENT
Start: 2020-01-01 | End: 2020-01-01

## 2020-01-01 RX ORDER — POTASSIUM CHLORIDE 750 MG/1
40 CAPSULE, EXTENDED RELEASE ORAL AS NEEDED
Status: DISCONTINUED | OUTPATIENT
Start: 2020-01-01 | End: 2020-04-04 | Stop reason: HOSPADM

## 2020-01-01 RX ORDER — DIPHENHYDRAMINE HYDROCHLORIDE 50 MG/ML
12.5 INJECTION INTRAMUSCULAR; INTRAVENOUS
Status: DISCONTINUED | OUTPATIENT
Start: 2020-01-01 | End: 2020-01-01 | Stop reason: HOSPADM

## 2020-01-01 RX ORDER — SODIUM CHLORIDE, SODIUM LACTATE, POTASSIUM CHLORIDE, CALCIUM CHLORIDE 600; 310; 30; 20 MG/100ML; MG/100ML; MG/100ML; MG/100ML
INJECTION, SOLUTION INTRAVENOUS CONTINUOUS PRN
Status: DISCONTINUED | OUTPATIENT
Start: 2020-01-01 | End: 2020-01-01 | Stop reason: SURG

## 2020-01-01 RX ORDER — IODIXANOL 320 MG/ML
350 INJECTION, SOLUTION INTRAVASCULAR
Status: COMPLETED | OUTPATIENT
Start: 2020-01-01 | End: 2020-01-01

## 2020-01-01 RX ORDER — MANNITOL 20 G/100ML
100 INJECTION, SOLUTION INTRAVENOUS ONCE
Status: COMPLETED | OUTPATIENT
Start: 2020-01-01 | End: 2020-01-01

## 2020-01-01 RX ORDER — MANNITOL 20 G/100ML
20 INJECTION, SOLUTION INTRAVENOUS EVERY 4 HOURS
Status: DISCONTINUED | OUTPATIENT
Start: 2020-01-01 | End: 2020-01-01

## 2020-01-01 RX ORDER — CHLORHEXIDINE GLUCONATE 0.12 MG/ML
15 RINSE ORAL EVERY 12 HOURS SCHEDULED
Status: DISCONTINUED | OUTPATIENT
Start: 2020-01-01 | End: 2020-04-04 | Stop reason: HOSPADM

## 2020-01-01 RX ORDER — SODIUM CHLORIDE 0.9 % (FLUSH) 0.9 %
10 SYRINGE (ML) INJECTION AS NEEDED
Status: DISCONTINUED | OUTPATIENT
Start: 2020-01-01 | End: 2020-04-04 | Stop reason: HOSPADM

## 2020-01-01 RX ORDER — LIDOCAINE HYDROCHLORIDE 20 MG/ML
INJECTION, SOLUTION INFILTRATION; PERINEURAL AS NEEDED
Status: DISCONTINUED | OUTPATIENT
Start: 2020-01-01 | End: 2020-01-01 | Stop reason: SURG

## 2020-01-01 RX ORDER — SODIUM CHLORIDE 9 MG/ML
INJECTION, SOLUTION INTRAVENOUS AS NEEDED
Status: DISCONTINUED | OUTPATIENT
Start: 2020-01-01 | End: 2020-01-01 | Stop reason: HOSPADM

## 2020-01-01 RX ORDER — LABETALOL HYDROCHLORIDE 5 MG/ML
5 INJECTION, SOLUTION INTRAVENOUS
Status: DISCONTINUED | OUTPATIENT
Start: 2020-01-01 | End: 2020-01-01 | Stop reason: HOSPADM

## 2020-01-01 RX ORDER — FENTANYL CITRATE 50 UG/ML
50 INJECTION, SOLUTION INTRAMUSCULAR; INTRAVENOUS
Status: DISCONTINUED | OUTPATIENT
Start: 2020-01-01 | End: 2020-01-01 | Stop reason: HOSPADM

## 2020-01-01 RX ORDER — PROMETHAZINE HYDROCHLORIDE 25 MG/1
25 SUPPOSITORY RECTAL ONCE AS NEEDED
Status: DISCONTINUED | OUTPATIENT
Start: 2020-01-01 | End: 2020-01-01 | Stop reason: HOSPADM

## 2020-01-01 RX ORDER — LORAZEPAM 2 MG/ML
2 INJECTION INTRAMUSCULAR ONCE
Status: DISCONTINUED | OUTPATIENT
Start: 2020-01-01 | End: 2020-04-04 | Stop reason: HOSPADM

## 2020-01-01 RX ORDER — SODIUM CHLORIDE 450 MG/100ML
150 INJECTION, SOLUTION INTRAVENOUS CONTINUOUS
Status: DISCONTINUED | OUTPATIENT
Start: 2020-01-01 | End: 2020-04-04 | Stop reason: HOSPADM

## 2020-01-01 RX ORDER — PROMETHAZINE HYDROCHLORIDE 25 MG/ML
6.25 INJECTION, SOLUTION INTRAMUSCULAR; INTRAVENOUS
Status: DISCONTINUED | OUTPATIENT
Start: 2020-01-01 | End: 2020-01-01 | Stop reason: HOSPADM

## 2020-01-01 RX ORDER — EPHEDRINE SULFATE 50 MG/ML
5 INJECTION, SOLUTION INTRAVENOUS ONCE AS NEEDED
Status: DISCONTINUED | OUTPATIENT
Start: 2020-01-01 | End: 2020-01-01 | Stop reason: HOSPADM

## 2020-01-01 RX ORDER — FENTANYL CITRATE 50 UG/ML
100 INJECTION, SOLUTION INTRAMUSCULAR; INTRAVENOUS
Status: DISCONTINUED | OUTPATIENT
Start: 2020-01-01 | End: 2020-04-04 | Stop reason: HOSPADM

## 2020-01-01 RX ORDER — MAGNESIUM SULFATE HEPTAHYDRATE 40 MG/ML
2 INJECTION, SOLUTION INTRAVENOUS AS NEEDED
Status: DISCONTINUED | OUTPATIENT
Start: 2020-01-01 | End: 2020-04-04 | Stop reason: HOSPADM

## 2020-01-01 RX ORDER — METOPROLOL TARTRATE 5 MG/5ML
INJECTION INTRAVENOUS AS NEEDED
Status: DISCONTINUED | OUTPATIENT
Start: 2020-01-01 | End: 2020-01-01 | Stop reason: SURG

## 2020-01-01 RX ORDER — OXYCODONE AND ACETAMINOPHEN 7.5; 325 MG/1; MG/1
1 TABLET ORAL ONCE AS NEEDED
Status: DISCONTINUED | OUTPATIENT
Start: 2020-01-01 | End: 2020-01-01 | Stop reason: HOSPADM

## 2020-01-01 RX ADMIN — FENTANYL CITRATE 50 MCG: 50 INJECTION INTRAMUSCULAR; INTRAVENOUS at 11:30

## 2020-01-01 RX ADMIN — FAMOTIDINE 20 MG: 10 INJECTION, SOLUTION INTRAVENOUS at 08:48

## 2020-01-01 RX ADMIN — SUCCINYLCHOLINE CHLORIDE 95.4 MG: 20 INJECTION, SOLUTION INTRAMUSCULAR; INTRAVENOUS at 19:22

## 2020-01-01 RX ADMIN — FENTANYL CITRATE 100 MCG: 0.05 INJECTION, SOLUTION INTRAMUSCULAR; INTRAVENOUS at 08:16

## 2020-01-01 RX ADMIN — PHENYLEPHRINE HYDROCHLORIDE 0.5 MCG/KG/MIN: 10 INJECTION INTRAVENOUS at 14:18

## 2020-01-01 RX ADMIN — IOPAMIDOL 100 ML: 755 INJECTION, SOLUTION INTRAVENOUS at 21:05

## 2020-01-01 RX ADMIN — CHLORHEXIDINE GLUCONATE 15 ML: 1.2 RINSE ORAL at 20:13

## 2020-01-01 RX ADMIN — NIMODIPINE 60 MG: 30 CAPSULE ORAL at 00:06

## 2020-01-01 RX ADMIN — SODIUM CHLORIDE 500 ML: 9 INJECTION, SOLUTION INTRAVENOUS at 18:05

## 2020-01-01 RX ADMIN — FENTANYL CITRATE 100 MCG: 50 INJECTION INTRAMUSCULAR; INTRAVENOUS at 08:16

## 2020-01-01 RX ADMIN — POTASSIUM CHLORIDE AND SODIUM CHLORIDE 60 ML/HR: 900; 150 INJECTION, SOLUTION INTRAVENOUS at 18:35

## 2020-01-01 RX ADMIN — MANNITOL 20 G: 20 INJECTION, SOLUTION INTRAVENOUS at 08:49

## 2020-01-01 RX ADMIN — MANNITOL 100 G: 20 INJECTION, SOLUTION INTRAVENOUS at 15:50

## 2020-01-01 RX ADMIN — LEVETIRACETAM 500 MG: 5 INJECTION INTRAVENOUS at 08:59

## 2020-01-01 RX ADMIN — METOPROLOL TARTRATE 10 MG: 1 INJECTION, SOLUTION INTRAVENOUS at 12:18

## 2020-01-01 RX ADMIN — MANNITOL: 20 INJECTION, SOLUTION INTRAVENOUS at 12:54

## 2020-01-01 RX ADMIN — EPHEDRINE SULFATE 10 MG: 50 INJECTION INTRAVENOUS at 14:45

## 2020-01-01 RX ADMIN — FENTANYL CITRATE 50 MCG: 50 INJECTION INTRAMUSCULAR; INTRAVENOUS at 10:10

## 2020-01-01 RX ADMIN — SODIUM CHLORIDE, PRESERVATIVE FREE 10 ML: 5 INJECTION INTRAVENOUS at 20:12

## 2020-01-01 RX ADMIN — ROCURONIUM BROMIDE 20 MG: 10 INJECTION, SOLUTION INTRAVENOUS at 14:40

## 2020-01-01 RX ADMIN — METOPROLOL TARTRATE 10 MG: 1 INJECTION, SOLUTION INTRAVENOUS at 12:32

## 2020-01-01 RX ADMIN — LIDOCAINE HYDROCHLORIDE 40 MG: 20 INJECTION, SOLUTION INFILTRATION; PERINEURAL at 12:08

## 2020-01-01 RX ADMIN — SODIUM CHLORIDE 5 MG/HR: 9 INJECTION, SOLUTION INTRAVENOUS at 21:28

## 2020-01-01 RX ADMIN — LIDOCAINE HYDROCHLORIDE 20 MG: 20 INJECTION, SOLUTION INFILTRATION; PERINEURAL at 12:12

## 2020-01-01 RX ADMIN — CHLORHEXIDINE GLUCONATE 15 ML: 1.2 RINSE ORAL at 08:52

## 2020-01-01 RX ADMIN — MANNITOL 20 G: 20 INJECTION, SOLUTION INTRAVENOUS at 04:34

## 2020-01-01 RX ADMIN — ROCURONIUM BROMIDE 50 MG: 10 INJECTION, SOLUTION INTRAVENOUS at 10:10

## 2020-01-01 RX ADMIN — NIMODIPINE 60 MG: 60 SOLUTION ORAL at 05:28

## 2020-01-01 RX ADMIN — LEVETIRACETAM 1000 MG: 10 INJECTION INTRAVENOUS at 20:06

## 2020-01-01 RX ADMIN — POTASSIUM CHLORIDE 40 MEQ: 1.5 POWDER, FOR SOLUTION ORAL at 06:53

## 2020-01-01 RX ADMIN — PHENYLEPHRINE HYDROCHLORIDE 100 MCG: 10 INJECTION INTRAVENOUS at 14:59

## 2020-01-01 RX ADMIN — LEVETIRACETAM 500 MG: 5 INJECTION INTRAVENOUS at 08:48

## 2020-01-01 RX ADMIN — Medication 0.02 MCG/KG/MIN: at 20:36

## 2020-01-01 RX ADMIN — SODIUM CHLORIDE 100 ML/HR: 4.5 INJECTION, SOLUTION INTRAVENOUS at 01:25

## 2020-01-01 RX ADMIN — IODIXANOL 200.4 ML: 320 INJECTION, SOLUTION INTRAVASCULAR at 12:00

## 2020-01-01 RX ADMIN — MANNITOL 20 G: 20 INJECTION, SOLUTION INTRAVENOUS at 00:31

## 2020-01-01 RX ADMIN — PROPOFOL 40 MG: 10 INJECTION, EMULSION INTRAVENOUS at 12:12

## 2020-01-01 RX ADMIN — LEVETIRACETAM 500 MG: 5 INJECTION INTRAVENOUS at 00:06

## 2020-01-01 RX ADMIN — PROPOFOL 9.96 MCG/KG/MIN: 10 INJECTION, EMULSION INTRAVENOUS at 19:28

## 2020-01-01 RX ADMIN — SODIUM CHLORIDE, POTASSIUM CHLORIDE, SODIUM LACTATE AND CALCIUM CHLORIDE: 600; 310; 30; 20 INJECTION, SOLUTION INTRAVENOUS at 10:05

## 2020-01-01 RX ADMIN — PROPOFOL 50 MCG/KG/MIN: 10 INJECTION, EMULSION INTRAVENOUS at 23:19

## 2020-01-01 RX ADMIN — METOPROLOL TARTRATE 10 MG: 1 INJECTION, SOLUTION INTRAVENOUS at 12:14

## 2020-01-01 RX ADMIN — FAMOTIDINE 20 MG: 10 INJECTION, SOLUTION INTRAVENOUS at 20:12

## 2020-01-01 RX ADMIN — CLEVIDIPINE 2 MG/HR: 0.5 EMULSION INTRAVENOUS at 12:26

## 2020-01-01 RX ADMIN — LEVETIRACETAM 500 MG: 5 INJECTION INTRAVENOUS at 20:11

## 2020-01-01 RX ADMIN — ROCURONIUM BROMIDE 50 MG: 10 INJECTION, SOLUTION INTRAVENOUS at 12:15

## 2020-01-01 RX ADMIN — NIMODIPINE 60 MG: 60 SOLUTION ORAL at 08:15

## 2020-01-01 RX ADMIN — MIDAZOLAM 2 MG: 1 INJECTION INTRAMUSCULAR; INTRAVENOUS at 21:37

## 2020-01-01 RX ADMIN — LIDOCAINE HYDROCHLORIDE 40 MG: 20 INJECTION, SOLUTION INFILTRATION; PERINEURAL at 12:11

## 2020-01-01 RX ADMIN — PHENYLEPHRINE HYDROCHLORIDE 100 MCG: 10 INJECTION INTRAVENOUS at 14:52

## 2020-01-01 RX ADMIN — ETOMIDATE 20 MG: 2 INJECTION, SOLUTION INTRAVENOUS at 19:41

## 2020-01-01 RX ADMIN — PROPOFOL 80 MG: 10 INJECTION, EMULSION INTRAVENOUS at 12:11

## 2020-01-01 RX ADMIN — PROPOFOL 50 MCG/KG/MIN: 10 INJECTION, EMULSION INTRAVENOUS at 04:03

## 2020-01-01 RX ADMIN — PROPOFOL 50 MCG/KG/MIN: 10 INJECTION, EMULSION INTRAVENOUS at 08:59

## 2020-01-01 RX ADMIN — MANNITOL 20 G: 20 INJECTION, SOLUTION INTRAVENOUS at 20:11

## 2020-01-01 RX ADMIN — PROPOFOL 80 MG: 10 INJECTION, EMULSION INTRAVENOUS at 12:08

## 2020-04-01 PROBLEM — I60.9 ACUTE SPONTANEOUS SUBARACHNOID INTRACRANIAL HEMORRHAGE (HCC): Status: ACTIVE | Noted: 2020-01-01

## 2020-04-01 NOTE — ED PROVIDER NOTES
EMERGENCY DEPARTMENT ENCOUNTER    Room Number:  16/16  Date of encounter:  4/1/2020  PCP: Provider, No Known  Historian: EMS      HPI:  Chief Complaint: Unresponsive  A complete HPI/ROS/PMH/PSH/SH/FH are unobtainable due to: Critical illness  Context: Denisse Reyes is a 54 y.o. female who presents to the ED for evaluation after she had a sudden onset headache with nausea and vomiting earlier this evening. Patient is unresponsive in the ED and she is hypertensive. Glucose was 161 per EMS. Patient vomited and did not have a protected airway. Patient is non-English speaking. No recent international travel.      MEDICAL HISTORY REVIEW  Patient has no medical records on file.     PAST MEDICAL HISTORY  Active Ambulatory Problems     Diagnosis Date Noted   • No Active Ambulatory Problems     Resolved Ambulatory Problems     Diagnosis Date Noted   • No Resolved Ambulatory Problems     No Additional Past Medical History         PAST SURGICAL HISTORY  No past surgical history on file.      FAMILY HISTORY  No family history on file.      SOCIAL HISTORY  Social History     Socioeconomic History   • Marital status: Single     Spouse name: Not on file   • Number of children: Not on file   • Years of education: Not on file   • Highest education level: Not on file         ALLERGIES  Patient has no allergy information on record.        REVIEW OF SYSTEMS  Review of Systems   Unable to perform ROS: Patient unresponsive       PHYSICAL EXAM    I have reviewed the triage vital signs and nursing notes.    ED Triage Vitals   Temp Heart Rate Resp BP SpO2   04/01/20 2013 04/01/20 1913 04/01/20 1913 04/01/20 1913 04/01/20 2005   99.6 °F (37.6 °C) 84 8 (!) 190/100 100 %      Temp src Heart Rate Source Patient Position BP Location FiO2 (%)   04/01/20 2013 04/01/20 2013 04/01/20 2013 04/01/20 2013 04/01/20 1930   Tympanic Monitor Lying Left arm 60 %         Physical Exam    Physical Exam   Constitutional: No distress.  Unconscious and minimally  responsive to painful stimuli  HENT:  Head: Normocephalic and atraumatic.  Oropharynx: Mucous membranes are moist.   Eyes: No scleral icterus. No conjunctival pallor. Pupils are 3-4 mm, equal, round, and reactive to light  Neck: Neck supple.   Cardiovascular: Normal rate, regular rhythm and intact distal pulses. Strong radial pulses.  Pulmonary/Chest: No respiratory distress. There are no wheezes, no rhonchi, and no rales.  Not protecting her airway  Abdominal: Soft.  Musculoskeletal: There is no pedal edema or calf tenderness.   Neurological: Localizes to pain  Skin: Skin is pink, warm, and dry. No pallor.   Nursing note and vitals reviewed.    LAB RESULTS  Recent Results (from the past 24 hour(s))   Comprehensive Metabolic Panel    Collection Time: 04/01/20  7:30 PM   Result Value Ref Range    Glucose 217 (H) 65 - 99 mg/dL    BUN 16 6 - 20 mg/dL    Creatinine 0.56 (L) 0.57 - 1.00 mg/dL    Sodium 137 136 - 145 mmol/L    Potassium 3.3 (L) 3.5 - 5.2 mmol/L    Chloride 99 98 - 107 mmol/L    CO2 23.1 22.0 - 29.0 mmol/L    Calcium 8.4 (L) 8.6 - 10.5 mg/dL    Total Protein 8.6 (H) 6.0 - 8.5 g/dL    Albumin 4.80 3.50 - 5.20 g/dL    ALT (SGPT) 36 (H) 1 - 33 U/L    AST (SGOT) 33 (H) 1 - 32 U/L    Alkaline Phosphatase 95 39 - 117 U/L    Total Bilirubin 0.3 0.2 - 1.2 mg/dL    eGFR Non African Amer 114 >60 mL/min/1.73    eGFR  African Amer 138 >60 mL/min/1.73    Globulin 3.8 gm/dL    A/G Ratio 1.3 g/dL    BUN/Creatinine Ratio 28.6 (H) 7.0 - 25.0    Anion Gap 14.9 5.0 - 15.0 mmol/L   Protime-INR    Collection Time: 04/01/20  7:30 PM   Result Value Ref Range    Protime 12.2 11.7 - 14.2 Seconds    INR 0.93 0.90 - 1.10   aPTT    Collection Time: 04/01/20  7:30 PM   Result Value Ref Range    PTT 25.1 22.7 - 35.4 seconds   Troponin    Collection Time: 04/01/20  7:30 PM   Result Value Ref Range    Troponin T <0.010 0.000 - 0.030 ng/mL   Type & Screen    Collection Time: 04/01/20  7:30 PM   Result Value Ref Range    ABO Type B     RH  type Negative     Antibody Screen Negative     T&S Expiration Date 4/4/2020 11:59:59 PM    Light Blue Top    Collection Time: 04/01/20  7:30 PM   Result Value Ref Range    Extra Tube hold for add-on    Green Top (Gel)    Collection Time: 04/01/20  7:30 PM   Result Value Ref Range    Extra Tube Hold for add-ons.    Lavender Top    Collection Time: 04/01/20  7:30 PM   Result Value Ref Range    Extra Tube hold for add-on    Gold Top - SST    Collection Time: 04/01/20  7:30 PM   Result Value Ref Range    Extra Tube Hold for add-ons.    CBC Auto Differential    Collection Time: 04/01/20  7:30 PM   Result Value Ref Range    WBC 11.81 (H) 3.40 - 10.80 10*3/mm3    RBC 3.95 3.77 - 5.28 10*6/mm3    Hemoglobin 11.5 (L) 12.0 - 15.9 g/dL    Hematocrit 35.0 34.0 - 46.6 %    MCV 88.6 79.0 - 97.0 fL    MCH 29.1 26.6 - 33.0 pg    MCHC 32.9 31.5 - 35.7 g/dL    RDW 12.0 (L) 12.3 - 15.4 %    RDW-SD 38.6 37.0 - 54.0 fl    MPV 11.3 6.0 - 12.0 fL    Platelets 272 140 - 450 10*3/mm3   Manual Differential    Collection Time: 04/01/20  7:30 PM   Result Value Ref Range    Neutrophil % 44.9 42.7 - 76.0 %    Lymphocyte % 52.0 (H) 19.6 - 45.3 %    Monocyte % 2.0 (L) 5.0 - 12.0 %    Eosinophil % 1.0 0.3 - 6.2 %    Neutrophils Absolute 5.30 1.70 - 7.00 10*3/mm3    Lymphocytes Absolute 6.14 (H) 0.70 - 3.10 10*3/mm3    Monocytes Absolute 0.24 0.10 - 0.90 10*3/mm3    Eosinophils Absolute 0.12 0.00 - 0.40 10*3/mm3    RBC Morphology Normal Normal    Smudge Cells Slight/1+ None Seen    Platelet Morphology Normal Normal   Blood Gas, Arterial    Collection Time: 04/01/20  9:12 PM   Result Value Ref Range    Site Arterial: right radial     Leandro's Test Positive     pH, Arterial 7.387 7.350 - 7.450 pH units    pCO2, Arterial 40.6 35.0 - 45.0 mm Hg    pO2, Arterial 281.0 (H) 80.0 - 100.0 mm Hg    HCO3, Arterial 24.4 22.0 - 28.0 mmol/L    Base Excess, Arterial -0.6 (L) 0.0 - 2.0 mmol/L    O2 Saturation Calculated 99.9 (H) 92.0 - 99.0 %    A-a Gradiant 0.7  mmHg    Barometric Pressure for Blood Gas 752.4 mmHg    Modality Adult Vent     FIO2 60 %    Ventilator Mode VC     Set Tidal Volume 500     Set Mech Resp Rate 18     Rate 21 Breaths/minute    PEEP 5        Ordered the above labs and independently reviewed the results.        RADIOLOGY  Xr Chest 1 View    Result Date: 4/1/2020  PORTABLE CHEST  HISTORY: Altered mental status.  FINDINGS: A single view of the chest demonstrates an endotracheal tube in place with the tip in satisfactory position midway between the thoracic inlet. The heart is within normal limits in size. There is mild interstitial prominence at the lung bases bilaterally. There is no evidence of focal infiltrate, consolidation or effusion.  This report was finalized on 4/1/2020 8:10 PM by Dr. Clovis Young M.D.      Ct Head Without Contrast Stroke Protocol    Result Date: 4/1/2020  CT HEAD WITHOUT CONTRAST  HISTORY: Altered mental status.  TECHNIQUE: A noncontrasted CT examination of the brain was performed. No prior studies available for comparison.    FINDINGS: There is diffuse subarachnoid hemorrhage. There is a small amount of blood also present within the fourth ventricle and to a lesser extent third ventricle. There is mild prominence of the temporal horns. There is no evidence of midline shift or focal area of decreased attenuation to suggest acute infarction.      Diffuse subarachnoid hemorrhage most suggestive of aneurysmal hemorrhage. Further evaluation with a conventional catheter directed arteriogram is recommended. There is mild prominence of the temporal horns. Blood is present within the fourth ventricle and to a lesser extent third ventricle. The above information was called to both Dr. Triana and Dr. Yanes prior to the dictation..    Radiation dose reduction techniques were utilized, including automated exposure control and exposure modulation based on body size.         I ordered the above noted radiological studies. Reviewed by me  and discussed with radiologist.  See dictation for official radiology interpretation.      PROCEDURES    Intubation  Date/Time: 4/1/2020 7:40 PM  Performed by: Jacob Yanes MD  Authorized by: Jacob Yanes MD     Consent:     Consent obtained:  Emergent situation  Pre-procedure details:     Patient status:  Unresponsive    Pretreatment meds: etomidate.    Paralytics:  Succinylcholine  Procedure details:     Preoxygenation:  Nonrebreather mask    CPR in progress: no      Intubation method:  Oral    Oral intubation technique:  Video-assisted    Tube size (mm):  7.0    Tube type:  Cuffed    Number of attempts:  1    Ventilation between attempts: yes      Cricoid pressure: yes      Tube visualized through cords: yes    Placement assessment:     ETT to lip:  20    Tube secured with:  ETT singh    Breath sounds:  Equal    Placement verification: chest rise and CXR verification      CXR findings:  ETT in proper place  Post-procedure details:     Patient tolerance of procedure:  Tolerated well, no immediate complications  Critical Care  Performed by: Jacob Yanes MD  Authorized by: Jacob Yanes MD     Critical care provider statement:     Critical care time (minutes):  45    Critical care start time:  4/1/2020 7:30 PM    Critical care end time:  4/1/2020 8:15 PM    Critical care time was exclusive of:  Separately billable procedures and treating other patients    Critical care was necessary to treat or prevent imminent or life-threatening deterioration of the following conditions:  CNS failure or compromise    Critical care was time spent personally by me on the following activities:  Blood draw for specimens, development of treatment plan with patient or surrogate, discussions with consultants, evaluation of patient's response to treatment, examination of patient, interpretation of cardiac output measurements, gastric intubation, obtaining history from patient or surrogate, ordering and performing treatments and  interventions, ordering and review of laboratory studies, ordering and review of radiographic studies, pulse oximetry, re-evaluation of patient's condition, review of old charts and ventilator management    I assumed direction of critical care for this patient from another provider in my specialty: no          MEDICATIONS GIVEN IN ER    Medications   propofol (DIPRIVAN) infusion 10 mg/mL 100 mL (50 mcg/kg/min × 63.6 kg Intravenous Rate/Dose Change 4/1/20 2100)   sodium chloride 0.9 % flush 10 mL (has no administration in time range)   ondansetron (ZOFRAN) injection 4 mg (has no administration in time range)   succinylcholine (ANECTINE) injection 100 mg (has no administration in time range)   niCARdipine (CARDENE) 25 mg in 250 mL NS (0.1 mg/mL) infusion kit (5 mg/hr Intravenous New Bag 4/1/20 2128)   midazolam (VERSED) injection 2 mg (has no administration in time range)   etomidate (AMIDATE) injection 19.08 mg (20 mg Intravenous Given 4/1/20 1941)   levETIRAcetam in NaCl 0.75% (KEPPRA) IVPB 1,000 mg (0 mg Intravenous Stopped 4/1/20 2107)   iopamidol (ISOVUE-370) 76 % injection 100 mL (100 mL Intravenous Given by Other 4/1/20 2105)         PROGRESS, DATA ANALYSIS, CONSULTS, AND MEDICAL DECISION MAKING    Patient was placed in face mask in first look. Patient was wearing facemask when I entered the room and throughout our encounter. I wore full protective equipment throughout this patient encounter including a face mask and gloves. Hand hygiene was performed before donning protective equipment and after removal when leaving the room.    1925  Team D called at this time.    1926  Reviewed the patient's case with Dr. Triana, neurology, who agrees with the plan to consult.    1933  I spoke with the patient's son, Raj. Per son, the patient had a mild headache yesterday and she took Nyquil. She was washing dishes earlier this evening when she began experiencing neck pain which progressed to nausea, vomiting, and then  she became unconscious. Son denies patient hx of HTN, but he does report the patient's sister and mother have hx of stroke.     1942  Reviewed the patient's chest XR. The ET tube is 5 cm above the isabel. Findings discussed with the respiratory therapist who will advance the tube 2 cm.     1952  Per Dr. Young, the patient's CT head shows a subarachnoid hemorrhage. He has discussed the findings with Dr. Triana    1954  Dr. Triana recommends giving the patient 1 mg Keppra. He recommends goal BP systolic <40 and to start Cardene.     2002  Reviewed the patient's case with Dr. Munson, neurosurgery, who will review the CT imaging himself, and he request CT head angiogram. Neurology noted poor prognosis.     2015  Patient's son and  are in the family room in the ED.    2028  I spoke with Jeannette, case management, and I spoke with the family at length regarding the patient's condition and her prognosis.     2053  Reviewed the patient's case with Dr. Zavala who agrees to admit the patient to the ICU.     2108  Dr. Munson has evaluated the patient at the bedside, and he is speaking to the family in the family room at this time.     2130  Repeat CXR shows ET tube is below the clavicles and 3 cm above the isabel.    All labs have been independently reviewed by me.  All radiology studies have been reviewed by me and discussed with radiologist dictating the report.   EKG's independently viewed and interpreted by me.  Discussion below represents my analysis of pertinent findings related to patient's condition, differential diagnosis, treatment plan and final disposition.    ED Course as of Apr 01 2134 Wed Apr 01, 2020 2131 EKG           EKG time: 2124  Rhythm/Rate: Sinus, 80  P waves and WI: TALI within normal limits  QRS, axis: Narrow QRS complex,    ST and T waves:    No STEMI  QTC is within normal limits    Interpreted Contemporaneously by me, independently viewed  Comparison: Unavailable        [RS]      ED Course  User Index  [RS] Jacob Yanes MD       AS OF 21:34 VITALS:    BP - 145/88  HR - 94  TEMP - 99.6 °F (37.6 °C) (Tympanic)  O2 SATS - 100%        DIAGNOSIS  Final diagnoses:   Acute spontaneous subarachnoid intracranial hemorrhage (CMS/HCC)   Hypertension, unspecified type         DISPOSITION  ADMISSION    Discussed treatment plan and reason for admission with pt/family and admitting physician.  Pt/family voiced understanding of the plan for admission for further testing/treatment as needed.       Documented by shaista Wadr for Dr. Jacob Yanes MD. The scribe documented verbatim dictation from outside the patient's room, and was not physically present during this encounter. Information recorded by the scribe was done at my direction and has been verified and validated by me.                 Dea Ward  04/01/20 [id number]       Jacob Yanes MD  04/01/20 [id number]

## 2020-04-02 PROBLEM — I60.9 SAH (SUBARACHNOID HEMORRHAGE) (HCC): Status: ACTIVE | Noted: 2020-01-01

## 2020-04-02 NOTE — ANESTHESIA PREPROCEDURE EVALUATION
Anesthesia Evaluation     Patient summary reviewed                Airway   Dental      Pulmonary    Cardiovascular         Neuro/Psych  GI/Hepatic/Renal/Endo      Musculoskeletal     Abdominal    Substance History      OB/GYN          Other            Phys Exam Other: ett in situ                Anesthesia Plan    ASA 3 - emergent     general       Anesthetic plan, all risks, benefits, and alternatives have been provided, discussed and informed consent has been obtained with: spouse/significant other.

## 2020-04-02 NOTE — PRE-PROCEDURE NOTE
Dr. Munson did telephone informed consent with Mayda Boone and Jami Jay at witnesses to son, Raj. Report given to Dr. MARIA D Hoskins and patient taken to OR via ambu bag

## 2020-04-02 NOTE — PAYOR COMM NOTE
"Ameya Currie (54 y.o. Female)                            ATTN: INITIAL CLINICALS FOR REVIEW                         AUTH# [id number]                          [phone], OR CALL [phone]        Date of Birth Social Security Number Address Home Phone MRN    [id number]  [id number]  [address]  [facility] [address] [phone] [id number]    Shinto Marital Status          None Single       Admission Date Admission Type Admitting Provider Attending Provider Department, Room/Bed    4/1/20 Emergency Radhames Zavala MD Jain, Subin, MD [facility] CORONARY CARE, N332/1    Discharge Date Discharge Disposition Discharge Destination                       Attending Provider:  Radhames Zavala MD    Allergies:  No Known Allergies    Isolation:  None   Infection:  None   Code Status:  CPR    Ht:  154.9 cm (61\")   Wt:  63.6 kg (140 lb 3.2 oz)    Admission Cmt:  None   Principal Problem:  SAH (subarachnoid hemorrhage) (CMS/[facility]) [I60.9] More...                 Active Insurance as of 4/1/2020     Primary Coverage     Payor Plan Insurance Group Employer/Plan Group    [place] [id number]     Payor Plan Address Payor Plan Phone Number Payor Plan Fax Number Effective Dates    [employer] Claims Office - [address] [address] [phone]  1/1/2020 - None Entered    [facility] [id number]       Subscriber Name Subscriber Birth Date Member ID       AMEYA CURRIE 1965 [id number]                 Emergency Contacts      (Rel.) Home Phone Work Phone Mobile Phone    Raj Solorio (Son) [phone] -- --    Davy Solorio (Daughter) [phone] -- --               History & Physical      Radhames Zavala MD at 04/01/20 2244              [facility]  [phone]  Radhames Zavala MD      Subjective   LOS: 0 days     54-year-old female who has no significant known medical history.  At about 4 PM today developed nausea with vomiting and headache.  She went to lay down.  After couple of " "hours she woke up with worsening symptoms.  Subsequently she lost consciousness.  EMS was called and in the meantime family initiated CPR.  Please note it is not clear that the patient had respiratory or cardiac arrest.  Family is unsure and was simply scared and initiated CPR.  Subsequent to the \"CPR\" she woke up.  She stated that she felt better for a few minutes and thereafter started having nausea and vomiting.  By this time EMS arrived.  She was brought to the hospital.  She continued to have vomiting and was intubated for airway protection.  Initial blood pressure recorded is 190/100.  CT head shows subarachnoid hemorrhage.  She has been evaluated by neurosurgery.  Initial plans was for a ventricular drain but this is now been deferred until evaluation by CT head in the morning.  Patient has a history of a sister dying of hemorrhagic bleed.  Also possibly patient's mother  of a hemorrhagic bleed.  Unknown if these were aneurysmal bleeds or subarachnoid hemorrhages.    Patient does not have any history of fever or chills.  No reported cough.  Family states there has been no recent travel by the patient or close contacts.  Patient has been self isolating since January.    Denisse Reyes  has no alcohol history on file.,  has no tobacco history on file.     Past Hx:  has no past medical history on file.  Surg Hx:  has no past surgical history on file.  FH: family history is not on file.  SH:  has no tobacco, alcohol, and drug history on file.    No medications prior to admission.     Allergies not on file    Review of Systems   Unable to perform ROS: Intubated     Vital Signs past 24hrs  BP range: BP: (122-190)/() 130/71  Pulse range: Heart Rate:  [73-94] 87  Resp rate range: Resp:  [8] 8  Temp range: Temp (24hrs), Av °F (37.8 °C), Min:99.6 °F (37.6 °C), Max:100.3 °F (37.9 °C)    Oxygen range: SpO2:  [100 %] 100 %;  ;      63.6 kg (140 lb 3.2 oz); Body mass index is 26.49 kg/m².  No intake/output " data recorded.    Adult female who is intubated.  She is currently sedated with propofol.  With vigorous stimulation she does respond by moving her arms spontaneously and towards the ET tube.  Her legs move slightly but not as vigorously.  Pupils are equal and reactive.  Oral ET tube noted.  JVP not elevated trachea midline.  Lungs reveal bilateral air entry clear to auscultation no rales rhonchi wheeze.  Heart examination S1-S2 present rhythm regular no murmurs.  No edema in lower extremities.  Abdomen is soft nontender bowel sounds present no liver spleen enlargement.  No peripheral cyanosis clubbing.  No cervical, axillary, inguinal adenopathy.  As stated pupils equal and reactive.  Moves upper extremities with vigorous stimulation.  Lower extremities minimal movement.  No seizure-like activity noted.  Currently on propofol.  Detailed neuro exam was not possible.  GCS 5.    Results Review:    I have reviewed the laboratory and imaging data from current admission. My annotations are as noted in assessment and plan.    Medication Review:  I have reviewed the current MAR. My annotations are as noted in assessment and plan.    Plan   PCCM Problems  Subarachnoid hemorrhage  Suspected aneurysmal bleed  Acute resp failure - intubated for airway protection  Acute hypokalemia  Hypertensive emergency on admission      Plan of Treatment  Already seen by NS. Plan is CT head in AM and BP control for SBP < 140. Already has orders for Nicardipine and Nimotop. Also on Keppra. CTA ordered and pending.  Suspected aneurysmal bleed.    Acute respiratory failure with intubation for airway protection.  Ventilator settings adjusted.  Initial blood gas reviewed.  Repeat ABGs in the morning.  Chest x-ray looks clear.    Acute hypokalemia will replace.    Hypertensive emergency on admission.  Treat with nicardipine as ordered.  Goal systolic blood pressure is less than 140.    I spent 35 mins critical care time in care of this patient  outside of any procedures.     Part of this note may be an electronic transcription/translation of spoken language to printed text using the Dragon Dictation System.      Electronically signed by Radhames Zavala MD at 04/01/20 7859          Emergency Department Notes      Jacob Yanes MD at 04/01/20 1928      Procedure Orders    1. Critical Care [[id number]] ordered by Jacob Yanes MD at 04/01/20 2003     2. Intubation [[id number]] ordered by Jacob Yanes MD at 04/01/20 1940                 EMERGENCY DEPARTMENT ENCOUNTER    Room Number:  16/16  Date of encounter:  4/1/2020  PCP: Provider, No Known  Historian: EMS      HPI:  Chief Complaint: Unresponsive  A complete HPI/ROS/PMH/PSH/SH/FH are unobtainable due to: Critical illness  Context: Denisse Reyes is a 54 y.o. female who presents to the ED for evaluation after she had a sudden onset headache with nausea and vomiting earlier this evening. Patient is unresponsive in the ED and she is hypertensive. Glucose was 161 per EMS. Patient vomited and did not have a protected airway. Patient is non-English speaking. No recent international travel.      MEDICAL HISTORY REVIEW  Patient has no medical records on file.     PAST MEDICAL HISTORY  Active Ambulatory Problems     Diagnosis Date Noted   • No Active Ambulatory Problems     Resolved Ambulatory Problems     Diagnosis Date Noted   • No Resolved Ambulatory Problems     No Additional Past Medical History         PAST SURGICAL HISTORY  No past surgical history on file.      FAMILY HISTORY  No family history on file.      SOCIAL HISTORY  Social History     Socioeconomic History   • Marital status: Single     Spouse name: Not on file   • Number of children: Not on file   • Years of education: Not on file   • Highest education level: Not on file         ALLERGIES  Patient has no allergy information on record.        REVIEW OF SYSTEMS  Review of Systems   Unable to perform ROS: Patient unresponsive       PHYSICAL EXAM    I have  reviewed the triage vital signs and nursing notes.    ED Triage Vitals   Temp Heart Rate Resp BP SpO2   04/01/20 2013 04/01/20 1913 04/01/20 1913 04/01/20 1913 04/01/20 2005   99.6 °F (37.6 °C) 84 8 (!) 190/100 100 %      Temp src Heart Rate Source Patient Position BP Location FiO2 (%)   04/01/20 2013 04/01/20 2013 04/01/20 2013 04/01/20 2013 04/01/20 1930   Tympanic Monitor Lying Left arm 60 %         Physical Exam    Physical Exam   Constitutional: No distress.  Unconscious and minimally responsive to painful stimuli  HENT:  Head: Normocephalic and atraumatic.  Oropharynx: Mucous membranes are moist.   Eyes: No scleral icterus. No conjunctival pallor. Pupils are 3-4 mm, equal, round, and reactive to light  Neck: Neck supple.   Cardiovascular: Normal rate, regular rhythm and intact distal pulses. Strong radial pulses.  Pulmonary/Chest: No respiratory distress. There are no wheezes, no rhonchi, and no rales.  Not protecting her airway  Abdominal: Soft.  Musculoskeletal: There is no pedal edema or calf tenderness.   Neurological: Localizes to pain  Skin: Skin is pink, warm, and dry. No pallor.   Nursing note and vitals reviewed.    LAB RESULTS  Recent Results (from the past 24 hour(s))   Comprehensive Metabolic Panel    Collection Time: 04/01/20  7:30 PM   Result Value Ref Range    Glucose 217 (H) 65 - 99 mg/dL    BUN 16 6 - 20 mg/dL    Creatinine 0.56 (L) 0.57 - 1.00 mg/dL    Sodium 137 136 - 145 mmol/L    Potassium 3.3 (L) 3.5 - 5.2 mmol/L    Chloride 99 98 - 107 mmol/L    CO2 23.1 22.0 - 29.0 mmol/L    Calcium 8.4 (L) 8.6 - 10.5 mg/dL    Total Protein 8.6 (H) 6.0 - 8.5 g/dL    Albumin 4.80 3.50 - 5.20 g/dL    ALT (SGPT) 36 (H) 1 - 33 U/L    AST (SGOT) 33 (H) 1 - 32 U/L    Alkaline Phosphatase 95 39 - 117 U/L    Total Bilirubin 0.3 0.2 - 1.2 mg/dL    eGFR Non African Amer 114 >60 mL/min/1.73    eGFR  African Amer 138 >60 mL/min/1.73    Globulin 3.8 gm/dL    A/G Ratio 1.3 g/dL    BUN/Creatinine Ratio 28.6 (H) 7.0 -  25.0    Anion Gap 14.9 5.0 - 15.0 mmol/L   Protime-INR    Collection Time: 04/01/20  7:30 PM   Result Value Ref Range    Protime 12.2 11.7 - 14.2 Seconds    INR 0.93 0.90 - 1.10   aPTT    Collection Time: 04/01/20  7:30 PM   Result Value Ref Range    PTT 25.1 22.7 - 35.4 seconds   Troponin    Collection Time: 04/01/20  7:30 PM   Result Value Ref Range    Troponin T <0.010 0.000 - 0.030 ng/mL   Type & Screen    Collection Time: 04/01/20  7:30 PM   Result Value Ref Range    ABO Type B     RH type Negative     Antibody Screen Negative     T&S Expiration Date 4/4/2020 11:59:59 PM    Light Blue Top    Collection Time: 04/01/20  7:30 PM   Result Value Ref Range    Extra Tube hold for add-on    Green Top (Gel)    Collection Time: 04/01/20  7:30 PM   Result Value Ref Range    Extra Tube Hold for add-ons.    Lavender Top    Collection Time: 04/01/20  7:30 PM   Result Value Ref Range    Extra Tube hold for add-on    Gold Top - SST    Collection Time: 04/01/20  7:30 PM   Result Value Ref Range    Extra Tube Hold for add-ons.    CBC Auto Differential    Collection Time: 04/01/20  7:30 PM   Result Value Ref Range    WBC 11.81 (H) 3.40 - 10.80 10*3/mm3    RBC 3.95 3.77 - 5.28 10*6/mm3    Hemoglobin 11.5 (L) 12.0 - 15.9 g/dL    Hematocrit 35.0 34.0 - 46.6 %    MCV 88.6 79.0 - 97.0 fL    MCH 29.1 26.6 - 33.0 pg    MCHC 32.9 31.5 - 35.7 g/dL    RDW 12.0 (L) 12.3 - 15.4 %    RDW-SD 38.6 37.0 - 54.0 fl    MPV 11.3 6.0 - 12.0 fL    Platelets 272 140 - 450 10*3/mm3   Manual Differential    Collection Time: 04/01/20  7:30 PM   Result Value Ref Range    Neutrophil % 44.9 42.7 - 76.0 %    Lymphocyte % 52.0 (H) 19.6 - 45.3 %    Monocyte % 2.0 (L) 5.0 - 12.0 %    Eosinophil % 1.0 0.3 - 6.2 %    Neutrophils Absolute 5.30 1.70 - 7.00 10*3/mm3    Lymphocytes Absolute 6.14 (H) 0.70 - 3.10 10*3/mm3    Monocytes Absolute 0.24 0.10 - 0.90 10*3/mm3    Eosinophils Absolute 0.12 0.00 - 0.40 10*3/mm3    RBC Morphology Normal Normal    Smudge Cells  Slight/1+ None Seen    Platelet Morphology Normal Normal   Blood Gas, Arterial    Collection Time: 04/01/20  9:12 PM   Result Value Ref Range    Site Arterial: right radial     Leandro's Test Positive     pH, Arterial 7.387 7.350 - 7.450 pH units    pCO2, Arterial 40.6 35.0 - 45.0 mm Hg    pO2, Arterial 281.0 (H) 80.0 - 100.0 mm Hg    HCO3, Arterial 24.4 22.0 - 28.0 mmol/L    Base Excess, Arterial -0.6 (L) 0.0 - 2.0 mmol/L    O2 Saturation Calculated 99.9 (H) 92.0 - 99.0 %    A-a Gradiant 0.7 mmHg    Barometric Pressure for Blood Gas 752.4 mmHg    Modality Adult Vent     FIO2 60 %    Ventilator Mode VC     Set Tidal Volume 500     Set Mech Resp Rate 18     Rate 21 Breaths/minute    PEEP 5        Ordered the above labs and independently reviewed the results.        RADIOLOGY  Xr Chest 1 View    Result Date: 4/1/2020  PORTABLE CHEST  HISTORY: Altered mental status.  FINDINGS: A single view of the chest demonstrates an endotracheal tube in place with the tip in satisfactory position midway between the thoracic inlet. The heart is within normal limits in size. There is mild interstitial prominence at the lung bases bilaterally. There is no evidence of focal infiltrate, consolidation or effusion.  This report was finalized on 4/1/2020 8:10 PM by Dr. Clovis Young M.D.      Ct Head Without Contrast Stroke Protocol    Result Date: 4/1/2020  CT HEAD WITHOUT CONTRAST  HISTORY: Altered mental status.  TECHNIQUE: A noncontrasted CT examination of the brain was performed. No prior studies available for comparison.    FINDINGS: There is diffuse subarachnoid hemorrhage. There is a small amount of blood also present within the fourth ventricle and to a lesser extent third ventricle. There is mild prominence of the temporal horns. There is no evidence of midline shift or focal area of decreased attenuation to suggest acute infarction.      Diffuse subarachnoid hemorrhage most suggestive of aneurysmal hemorrhage. Further evaluation  with a conventional catheter directed arteriogram is recommended. There is mild prominence of the temporal horns. Blood is present within the fourth ventricle and to a lesser extent third ventricle. The above information was called to both Dr. Triana and Dr. Yanes prior to the dictation..    Radiation dose reduction techniques were utilized, including automated exposure control and exposure modulation based on body size.         I ordered the above noted radiological studies. Reviewed by me and discussed with radiologist.  See dictation for official radiology interpretation.      PROCEDURES    Intubation  Date/Time: 4/1/2020 7:40 PM  Performed by: Jacob Yanes MD  Authorized by: Jacob Yanes MD     Consent:     Consent obtained:  Emergent situation  Pre-procedure details:     Patient status:  Unresponsive    Pretreatment meds: etomidate.    Paralytics:  Succinylcholine  Procedure details:     Preoxygenation:  Nonrebreather mask    CPR in progress: no      Intubation method:  Oral    Oral intubation technique:  Video-assisted    Tube size (mm):  7.0    Tube type:  Cuffed    Number of attempts:  1    Ventilation between attempts: yes      Cricoid pressure: yes      Tube visualized through cords: yes    Placement assessment:     ETT to lip:  20    Tube secured with:  ETT singh    Breath sounds:  Equal    Placement verification: chest rise and CXR verification      CXR findings:  ETT in proper place  Post-procedure details:     Patient tolerance of procedure:  Tolerated well, no immediate complications  Critical Care  Performed by: Jacob Yanes MD  Authorized by: Jacob Yanes MD     Critical care provider statement:     Critical care time (minutes):  45    Critical care start time:  4/1/2020 7:30 PM    Critical care end time:  4/1/2020 8:15 PM    Critical care time was exclusive of:  Separately billable procedures and treating other patients    Critical care was necessary to treat or prevent imminent or  life-threatening deterioration of the following conditions:  CNS failure or compromise    Critical care was time spent personally by me on the following activities:  Blood draw for specimens, development of treatment plan with patient or surrogate, discussions with consultants, evaluation of patient's response to treatment, examination of patient, interpretation of cardiac output measurements, gastric intubation, obtaining history from patient or surrogate, ordering and performing treatments and interventions, ordering and review of laboratory studies, ordering and review of radiographic studies, pulse oximetry, re-evaluation of patient's condition, review of old charts and ventilator management    I assumed direction of critical care for this patient from another provider in my specialty: no          MEDICATIONS GIVEN IN ER    Medications   propofol (DIPRIVAN) infusion 10 mg/mL 100 mL (50 mcg/kg/min × 63.6 kg Intravenous Rate/Dose Change 4/1/20 2100)   sodium chloride 0.9 % flush 10 mL (has no administration in time range)   ondansetron (ZOFRAN) injection 4 mg (has no administration in time range)   succinylcholine (ANECTINE) injection 100 mg (has no administration in time range)   niCARdipine (CARDENE) 25 mg in 250 mL NS (0.1 mg/mL) infusion kit (5 mg/hr Intravenous New Bag 4/1/20 2128)   midazolam (VERSED) injection 2 mg (has no administration in time range)   etomidate (AMIDATE) injection 19.08 mg (20 mg Intravenous Given 4/1/20 1941)   levETIRAcetam in NaCl 0.75% (KEPPRA) IVPB 1,000 mg (0 mg Intravenous Stopped 4/1/20 2107)   iopamidol (ISOVUE-370) 76 % injection 100 mL (100 mL Intravenous Given by Other 4/1/20 2105)         PROGRESS, DATA ANALYSIS, CONSULTS, AND MEDICAL DECISION MAKING    Patient was placed in face mask in first look. Patient was wearing facemask when I entered the room and throughout our encounter. I wore full protective equipment throughout this patient encounter including a face mask and  gloves. Hand hygiene was performed before donning protective equipment and after removal when leaving the room.    1925  Team D called at this time.    1926  Reviewed the patient's case with Dr. Triana, neurology, who agrees with the plan to consult.    1933  I spoke with the patient's son, Raj. Per son, the patient had a mild headache yesterday and she took Nyquil. She was washing dishes earlier this evening when she began experiencing neck pain which progressed to nausea, vomiting, and then she became unconscious. Son denies patient hx of HTN, but he does report the patient's sister and mother have hx of stroke.     1942  Reviewed the patient's chest XR. The ET tube is 5 cm above the isabel. Findings discussed with the respiratory therapist who will advance the tube 2 cm.     1952  Per Dr. Young, the patient's CT head shows a subarachnoid hemorrhage. He has discussed the findings with Dr. Triana    1954  Dr. Triana recommends giving the patient 1 mg Keppra. He recommends goal BP systolic <40 and to start Cardene.     2002  Reviewed the patient's case with Dr. Munson, neurosurgery, who will review the CT imaging himself, and he request CT head angiogram. Neurology noted poor prognosis.     2015  Patient's son and  are in the family room in the ED.    2028  I spoke with Jeannette, case management, and I spoke with the family at length regarding the patient's condition and her prognosis.     2053  Reviewed the patient's case with Dr. Zavala who agrees to admit the patient to the ICU.     2108  Dr. Munson has evaluated the patient at the bedside, and he is speaking to the family in the family room at this time.     2130  Repeat CXR shows ET tube is below the clavicles and 3 cm above the isabel.    All labs have been independently reviewed by me.  All radiology studies have been reviewed by me and discussed with radiologist dictating the report.   EKG's independently viewed and interpreted by me.  Discussion  below represents my analysis of pertinent findings related to patient's condition, differential diagnosis, treatment plan and final disposition.    ED Course as of Apr 01 2134 Wed Apr 01, 2020 2131 EKG           EKG time: 2124  Rhythm/Rate: Sinus, 80  P waves and KS: TALI within normal limits  QRS, axis: Narrow QRS complex,    ST and T waves:    No STEMI  QTC is within normal limits    Interpreted Contemporaneously by me, independently viewed  Comparison: Unavailable        [RS]      ED Course User Index  [RS] Jacob Yanes MD       AS OF 21:34 VITALS:    BP - 145/88  HR - 94  TEMP - 99.6 °F (37.6 °C) (Tympanic)  O2 SATS - 100%        DIAGNOSIS  Final diagnoses:   Acute spontaneous subarachnoid intracranial hemorrhage (CMS/HCC)   Hypertension, unspecified type         DISPOSITION  ADMISSION    Discussed treatment plan and reason for admission with pt/family and admitting physician.  Pt/family voiced understanding of the plan for admission for further testing/treatment as needed.       Documented by scribnirav Ward for Dr. Jacob Yanes MD. The scribe documented verbatim dictation from outside the patient's room, and was not physically present during this encounter. Information recorded by the scribe was done at my direction and has been verified and validated by me.                 Dea Ward  04/01/20 2134       Jacob Yanes MD  04/01/20 2300      Electronically signed by Jacob Yanes MD at 04/01/20 2300       Vital Signs (last day)     Date/Time   Temp   Temp src   Pulse   Resp   BP   Patient Position   SpO2    04/02/20 0930   --   --   --   --   111/66   --   --    04/02/20 0915   --   --   81   --   109/64   --   --    04/02/20 0900   --   --   79   --   113/69   --   98    04/02/20 0815   --   --   80   --   110/67   --   --    04/02/20 0800   --   --   88   --   135/77   --   98    04/02/20 0745   --   --   103   --   132/83   --   99    04/02/20 0732   97.8 (36.6)   Oral   --   --   --   --   --     04/02/20 0730   --   --   97   --   126/79   --   99    04/02/20 0719   --   --   92   22   --   --   99    04/02/20 0715   --   --   96   --   139/81   --   99    04/02/20 0700   --   --   84   --   124/72   --   99    04/02/20 0645   --   --   85   --   125/71   --   100    04/02/20 0630   --   --   85   --   123/70   --   100    04/02/20 0615   --   --   84   --   122/69   --   100    04/02/20 0600   --   --   84   --   121/69   --   100    04/02/20 0545   --   --   83   --   127/73   --   100    04/02/20 0530   --   --   82   --   123/76   --   100    04/02/20 0515   --   --   80   --   121/72   --   100    04/02/20 0500   --   --   78   --   120/76   --   100    04/02/20 0445   --   --   78   --   127/78   --   100    04/02/20 0430   --   --   75   --   136/70   --   100    04/02/20 0426   --   --   93   --   --   --   100    04/02/20 0400   --   --   75   --   126/73   --   100    04/02/20 0345   --   --   74   --   126/71   --   100    04/02/20 0330   --   --   75   --   130/73   --   100    04/02/20 0317   98.9 (37.2)   Oral   77   --   --   --   100    04/02/20 0315   --   --   73   --   113/80   --   100    04/02/20 0300   --   --   92   --   140/83   --   95    04/02/20 0230   --   --   76   --   119/79   --   100    04/02/20 0215   --   --   77   --   118/80   --   100    04/02/20 0200   --   --   82   --   127/71   --   100    04/02/20 0145   --   --   76   --   118/74   --   100    04/02/20 0130   --   --   77   --   118/73   --   100 04/02/20 0115   --   --   78   --   117/72   --   100 04/02/20 0100   --   --   81   --   121/73   --   100 04/02/20 0045   --   --   77   --   122/79   --   100 04/02/20 0030   --   --   77   --   123/77   --   100 04/02/20 0028   --   --   76   --   --   --   100 04/02/20 0015   --   --   78   --   116/79   --   100 04/02/20 0000   --   --   78   --   122/78   --   100 04/01/20 2337   --   --   76   --   --   --   100 04/01/20 2308   --   --    75   --   112/72   --   100    04/01/20 2300   --   --   79   --   --   --   100    04/01/20 22:21:48   --   --   87   --   130/71   Lying   100 04/01/20 22:14:32   100.3 (37.9)   Tympanic   87   --   122/82   Lying   100    04/01/20 21:44:22   --   --   92   --   130/74   Lying   100    04/01/20 21:33:01   --   --   94   --   145/88   Lying   100 04/01/20 2020   --   --   73   --   142/94   --   100    04/01/20 20:13:41   99.6 (37.6)   Tympanic   76   --   139/94   Lying   100 04/01/20 2013   --   --   77   --   141/92   --   100    04/01/20 2005   --   --   77   --   139/94   --   100    04/01/20 1913   --   --   84   8   (!) 190/100   --   --              Oxygen Therapy (last day)     Date/Time   SpO2   Device (Oxygen Therapy)   Flow (L/min)   Oxygen Concentration (%)   ETCO2 (mmHg)    04/02/20 0900   98   --   --   --   --    04/02/20 0800   98   ventilator   --   --   --    04/02/20 0745   99   --   --   --   --    04/02/20 0730   99   --   --   --   --    04/02/20 0719   99   ventilator   --   24   --    04/02/20 0715   99   --   --   --   --    04/02/20 0700   99   --   --   --   --    04/02/20 0645   100   --   --   --   --    04/02/20 0630   100   --   --   --   --    04/02/20 0615   100   --   --   --   --    04/02/20 0600   100   --   --   --   --    04/02/20 0545   100   --   --   --   --    04/02/20 0530   100   --   --   --   --    04/02/20 0515   100   --   --   --   --    04/02/20 0500   100   --   --   --   --    04/02/20 0445   100   --   --   --   --    04/02/20 0430   100   --   --   --   --    04/02/20 0426   100   --   --   --   --    04/02/20 0400   100   --   --   --   --    04/02/20 0345   100   --   --   --   --    04/02/20 0330   100   --   --   --   --    04/02/20 0317   100   --   --   --   --    04/02/20 0315   100   --   --   --   --    04/02/20 0300   95   --   --   --   --    04/02/20 0230   100   --   --   --   --    04/02/20 0215   100   --   --   --   --    04/02/20 0200    100   --   --   --   --    04/02/20 0145   100   --   --   --   --    04/02/20 0130   100   --   --   --   --    04/02/20 0115   100   --   --   --   --    04/02/20 0100   100   --   --   --   --    04/02/20 0045   100   --   --   --   --    04/02/20 0030   100   --   --   --   --    04/02/20 0028   100   --   --   --   --    04/02/20 0015   100   --   --   --   --    04/02/20 0000   100   --   --   --   --    04/01/20 2337   100   --   --   --   --    04/01/20 2308   100   --   --   --   --    04/01/20 2300   100   ventilator   --   --   --    04/01/20 22:21:48   100   --   --   --   --    04/01/20 22:14:32   100   --   --   --   --    04/01/20 21:44:22   100   --   --   --   --    04/01/20 21:33:01   100   --   --   --   --    04/01/20 2020   100   --   --   --   --    04/01/20 20:13:41   100   --   --   --   --    04/01/20 2013   100   --   --   --   --    04/01/20 2005   100   --   --   --   --                Lab Results (last 24 hours)     Procedure Component Value Units Date/Time    Basic Metabolic Panel [[id number]]  (Abnormal) Collected:  04/02/20 0455    Specimen:  Blood Updated:  04/02/20 0542     Glucose 137 mg/dL      BUN 10 mg/dL      Creatinine 0.52 mg/dL      Sodium 132 mmol/L      Potassium 3.4 mmol/L      Chloride 95 mmol/L      CO2 22.9 mmol/L      Calcium 8.9 mg/dL      eGFR  African Amer 150 mL/min/1.73      eGFR Non African Amer 124 mL/min/1.73      BUN/Creatinine Ratio 19.2     Anion Gap 14.1 mmol/L     Narrative:       GFR Normal >60  Chronic Kidney Disease <60  Kidney Failure <15      Magnesium [[id number]]  (Normal) Collected:  04/02/20 0455    Specimen:  Blood Updated:  04/02/20 0542     Magnesium 1.8 mg/dL     Phosphorus [[id number]]  (Normal) Collected:  04/02/20 0455    Specimen:  Blood Updated:  04/02/20 0542     Phosphorus 3.1 mg/dL     CBC (No Diff) [[id number]]  (Abnormal) Collected:  04/02/20 0455    Specimen:  Blood Updated:  04/02/20 0508     WBC 13.25 10*3/mm3      RBC 3.97 10*6/mm3       Hemoglobin 11.6 g/dL      Hematocrit 34.3 %      MCV 86.4 fL      MCH 29.2 pg      MCHC 33.8 g/dL      RDW 12.0 %      RDW-SD 38.2 fl      MPV 10.4 fL      Platelets 200 10*3/mm3     Blood Gas, Arterial [[id number]]  (Abnormal) Collected:  04/02/20 0334    Specimen:  Arterial Blood Updated:  04/02/20 0336     Site Arterial: right radial     Leandro's Test Positive     pH, Arterial 7.425 pH units      pCO2, Arterial 38.0 mm Hg      pO2, Arterial 175.6 mm Hg      HCO3, Arterial 24.9 mmol/L      Base Excess, Arterial 0.6 mmol/L      O2 Saturation Calculated 99.6 %      A-a Gradiant 0.7 mmHg      Barometric Pressure for Blood Gas 752.2 mmHg      Modality Adult Vent     FIO2 40 %      Ventilator Mode AC     Set Tidal Volume 500     Set Mech Resp Rate 10     Rate 13 Breaths/minute      PEEP 5    POC Glucose Once [[id number]]  (Abnormal) Collected:  04/02/20 0258    Specimen:  Blood Updated:  04/02/20 0300     Glucose 151 mg/dL     POC Glucose Once [[id number]]  (Abnormal) Collected:  04/01/20 2245    Specimen:  Blood Updated:  04/01/20 2247     Glucose 149 mg/dL     Blood Gas, Arterial [[id number]]  (Abnormal) Collected:  04/01/20 2112    Specimen:  Arterial Blood Updated:  04/01/20 2114     Site Arterial: right radial     Leandro's Test Positive     pH, Arterial 7.387 pH units      pCO2, Arterial 40.6 mm Hg      pO2, Arterial 281.0 mm Hg      HCO3, Arterial 24.4 mmol/L      Base Excess, Arterial -0.6 mmol/L      O2 Saturation Calculated 99.9 %      A-a Gradiant 0.7 mmHg      Barometric Pressure for Blood Gas 752.4 mmHg      Modality Adult Vent     FIO2 60 %      Ventilator Mode VC     Set Tidal Volume 500     Set Mech Resp Rate 18     Rate 21 Breaths/minute      PEEP 5    CBC & Differential [[id number]] Collected:  04/01/20 1930    Specimen:  Blood Updated:  04/01/20 2056    Narrative:       The following orders were created for panel order CBC & Differential.  Procedure                               Abnormality          Status                     ---------                               -----------         ------                     CBC Auto Differential[[id number]]        Abnormal            Final result                 Please view results for these tests on the individual orders.    CBC Auto Differential [[id number]]  (Abnormal) Collected:  04/01/20 1930    Specimen:  Blood Updated:  04/01/20 2056     WBC 11.81 10*3/mm3      RBC 3.95 10*6/mm3      Hemoglobin 11.5 g/dL      Hematocrit 35.0 %      MCV 88.6 fL      MCH 29.1 pg      MCHC 32.9 g/dL      RDW 12.0 %      RDW-SD 38.6 fl      MPV 11.3 fL      Platelets 272 10*3/mm3     Manual Differential [[id number]]  (Abnormal) Collected:  04/01/20 1930    Specimen:  Blood Updated:  04/01/20 2056     Neutrophil % 44.9 %      Lymphocyte % 52.0 %      Monocyte % 2.0 %      Eosinophil % 1.0 %      Neutrophils Absolute 5.30 10*3/mm3      Lymphocytes Absolute 6.14 10*3/mm3      Monocytes Absolute 0.24 10*3/mm3      Eosinophils Absolute 0.12 10*3/mm3      RBC Morphology Normal     Smudge Cells Slight/1+     Platelet Morphology Normal    Comprehensive Metabolic Panel [[id number]]  (Abnormal) Collected:  04/01/20 1930    Specimen:  Blood Updated:  04/01/20 2039     Glucose 217 mg/dL      BUN 16 mg/dL      Creatinine 0.56 mg/dL      Sodium 137 mmol/L      Potassium 3.3 mmol/L      Chloride 99 mmol/L      CO2 23.1 mmol/L      Calcium 8.4 mg/dL      Total Protein 8.6 g/dL      Albumin 4.80 g/dL      ALT (SGPT) 36 U/L      AST (SGOT) 33 U/L      Alkaline Phosphatase 95 U/L      Total Bilirubin 0.3 mg/dL      eGFR Non African Amer 114 mL/min/1.73      eGFR  African Amer 138 mL/min/1.73      Globulin 3.8 gm/dL      A/G Ratio 1.3 g/dL      BUN/Creatinine Ratio 28.6     Anion Gap 14.9 mmol/L     Narrative:       GFR Normal >60  Chronic Kidney Disease <60  Kidney Failure <15      Protime-INR [[id number]]  (Normal) Collected:  04/01/20 1930    Specimen:  Blood Updated:  04/01/20 2037     Protime 12.2 Seconds       INR 0.93    aPTT [[id number]]  (Normal) Collected:  04/01/20 1930    Specimen:  Blood Updated:  04/01/20 2037     PTT 25.1 seconds     Troponin [[id number]]  (Normal) Collected:  04/01/20 1930    Specimen:  Blood Updated:  04/01/20 2035     Troponin T <0.010 ng/mL     Narrative:       Troponin T Reference Range:  <= 0.03 ng/mL-   Negative for AMI  >0.03 ng/mL-     Abnormal for myocardial necrosis.  Clinicians would have to utilize clinical acumen, EKG, Troponin and serial changes to determine if it is an Acute Myocardial Infarction or myocardial injury due to an underlying chronic condition.       Results may be falsely decreased if patient taking Biotin.      [place] Draw [[id number]] Collected:  04/01/20 1930    Specimen:  Blood Updated:  04/01/20 2030    Narrative:       The following orders were created for panel order [place] Draw.  Procedure                               Abnormality         Status                     ---------                               -----------         ------                     Light Blue Top[[id number]]                                   Final result               Green Top (Gel)[[id number]]                                  Final result               Lavender Top[[id number]]                                     Final result               Gold Top - SST[[id number]]                                   Final result                 Please view results for these tests on the individual orders.    Light Blue Top [[id number]] Collected:  04/01/20 1930    Specimen:  Blood Updated:  04/01/20 2030     Extra Tube hold for add-on     Comment: Auto resulted       Green Top (Gel) [[id number]] Collected:  04/01/20 1930    Specimen:  Blood Updated:  04/01/20 2030     Extra Tube Hold for add-ons.     Comment: Auto resulted.       Lavender Top [[id number]] Collected:  04/01/20 1930    Specimen:  Blood Updated:  04/01/20 2030     Extra Tube hold for add-on     Comment: Auto resulted       Gold Top - SST  [[id number]] Collected:  04/01/20 1930    Specimen:  Blood Updated:  04/01/20 2030     Extra Tube Hold for add-ons.     Comment: Auto resulted.           Imaging Results (Last 24 Hours)     Procedure Component Value Units Date/Time    CT Head Without Contrast Stroke Protocol [[id number]] Collected:  04/01/20 2007     Updated:  04/02/20 0715    Narrative:       CT HEAD WITHOUT CONTRAST     HISTORY: Altered mental status.     TECHNIQUE: A noncontrasted CT examination of the brain was performed. No  prior studies available for comparison.           FINDINGS: There is diffuse subarachnoid hemorrhage. There is a small  amount of blood also present within the fourth ventricle and to a lesser  extent third ventricle. There is mild prominence of the temporal horns.  There is no evidence of midline shift or focal area of decreased  attenuation to suggest acute infarction.       Impression:       Diffuse subarachnoid hemorrhage most suggestive of  aneurysmal hemorrhage. Further evaluation with a conventional catheter  directed arteriogram is recommended. There is mild prominence of the  temporal horns. Blood is present within the fourth ventricle and to a  lesser extent third ventricle. The above information was called to both  Dr. Triana and Dr. Yanes prior to the dictation..           Radiation dose reduction techniques were utilized, including automated  exposure control and exposure modulation based on body size.     This report was finalized on 4/2/2020 7:12 AM by Dr. Clovis Young M.D.       CT Head Without Contrast [[id number]] Collected:  04/02/20 0646     Updated:  04/02/20 0646    Narrative:       CRANIAL CT SCAN WITHOUT CONTRAST     CLINICAL HISTORY: fu; I60.9-Nontraumatic subarachnoid hemorrhage,  unspecified; I10-Essential (primary) hypertension     COMPARISON: 04/01/2020.     TECHNIQUE: Radiation dose reduction techniques were utilized, including  automated exposure control and exposure modulation based on body  size.  Multiple axial images of the head were obtained without contrast.      FINDINGS:  Rather extensive acute subarachnoid hemorrhage is again seen  diffusely bilaterally. It is particularly abundant along the basilar  cisterns, sylvian fissures, and craniocervical junction as before. The  overall quantity of hemorrhage is felt to have diminished slightly in  the interim. Hemorrhage in the ventricular system has increased  slightly. The overall caliber and configuration of the ventricular  system is similar to previous.  There is no evidence of hemorrhage or  parenchymal infarct demonstrated. Bone window images are unremarkable.                Impression:       Diffuse subarachnoid hemorrhage has diminished slightly in the interim.  No new hemorrhagic focus is appreciated.                         XR Chest 1 View [[id number]] Collected:  04/01/20 2140     Updated:  04/01/20 2259    Narrative:       PORTABLE CHEST X-RAY     CLINICAL HISTORY: ETT reposition; NG placed; I60.9-Nontraumatic  subarachnoid hemorrhage, unspecified; I10-Essential (primary)  hypertension     COMPARISON: 7:30 PM.     FINDINGS: Portable AP view of the chest was obtained with overlying  monitor leads in place. ET tube advanced slightly, now in the region of  the mid thoracic trachea. Lungs remain under aerated with some increase  in linear densities in the right lung base likely atelectasis. Left lung  is under aerated but clear. Heart size likely normal considering poor  inspiration and portable technique. No edema or significant pleural  fluid.             Impression:       Under aeration with increasing right lung base atelectasis        This report was finalized on 4/1/2020 10:56 PM by Balwinder Gomez M.D.       XR Abdomen KUB [[id number]] Collected:  04/01/20 2142     Updated:  04/01/20 2259    Narrative:       SINGLE VIEW ABDOMEN/KUB     HISTORY:ng placement; I60.9-Nontraumatic subarachnoid hemorrhage,  unspecified; I10-Essential (primary)  hypertension     COMPARISON: None.     FINDINGS: Portable view abdomen shows the nasogastric tube in the right  mid abdomen at the level of the more distal stomach.             Impression:       NG tube as above     This report was finalized on 4/1/2020 10:56 PM by Balwinder Gomez M.D.       CT Angiogram Head [[id number]] Collected:  04/01/20 2139     Updated:  04/01/20 2139    Narrative:       CT ANGIOGRAM HEAD     HISTORY: Acute subarachnoid hemorrhage,      COMPARISON: None..     TECHNIQUE:  Radiation dose reduction techniques were utilized, including  automated exposure control and exposure modulation based on body size.  Axial thin-section contrast enhanced CT angiogram images obtained from  the skull base through the calvarial vertex utilizing angiographic  technique. Multi projection 3-D MIP reformatted images were supplemented  and reviewed.        FINDINGS CRANIAL CTA ANGIOGRAM:     The left internal carotid artery shows only minimal atheromatous plaque  mostly along the cavernous segment. There is a rounded saccular type  aneurysm along the ophthalmic segment posterior medially best  illustrated on series 11, images 43-45. It has a fairly wide neck. It  measures approximately 3.0 x 2.7 mm in greatest dimensions. It is well  proximal to the ICA bifurcation which has an unremarkable appearance.  The left M-1 segment distally is somewhat small in caliber but enhances  normally without significant stenotic focus and there is normal filling  of the trifurcation vessels.     The left A-1 segment is ample and widely patent. There is an ovoid,  again saccular type aneurysm originating medially at the level of the  anterior communicating artery which is best illustrated on images 54-56  of series 11. It measures approximately 3.9 x 3.0 mm in greatest  dimensions. Internally its enhancement is somewhat heterogeneous and it  is felt that this aneurysm is the source of the previously described  extensive subarachnoid  blood. (Particularly supportive is a large ovoid  collection of hemorrhage which is contiguous with the upper margin of  the aneurysm extending cephalad along the interhemispheric fissure.)  There is filling of the left anterior cerebral artery distally.     The cavernous right internal carotid artery is ectatic with minimal  atheromatous plaque but without stenosis or aneurysm. The right internal  carotid artery terminates as the middle cerebral with an ample M-1  branch with normal filling of the trifurcation vessels. The right  anterior cerebral artery fills via the anterior communicating artery.     Vertebral arteries are patent with some mild atheromatous plaque  distally. There is minimal plaque in the basilar without significant  stenotic focus. The basilar terminus appears normal and supplies both of  the posterior cerebral arteries which are unremarkable.     Study was reviewed with Dr. Munson in attendance at 9:26 PM.        IMPRESSION CRANIAL CT ANGIOGRAM:    1. 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 Due  to the appearance of the anterior communicating artery aneurysm and  proximity of a large collection of hemorrhage, this aneurysm is felt to  be responsible for the acute subarachnoid hemorrhage. Follow-up  recommended.          XR Chest 1 View [[id number]] Collected:  04/01/20 2009     Updated:  04/01/20 2013    Narrative:       PORTABLE CHEST     HISTORY: Altered mental status.     FINDINGS: A single view of the chest demonstrates an endotracheal tube  in place with the tip in satisfactory position midway between the  thoracic inlet. The heart is within normal limits in size. There is mild  interstitial prominence at the lung bases bilaterally. There is no  evidence of focal infiltrate, consolidation or effusion.     This report was finalized on 4/1/2020 8:10 PM by Dr. Clovis Young M.D.           Orders  (last 24 hrs)      Start     Ordered    04/03/20 0500  CT Head Without Contrast  1 Time Imaging      04/02/20 0903    04/02/20 0921  Arteriogram (Powerscribe)  1 Time Imaging      04/02/20 0921    04/02/20 0903  Inpatient Neurointerventionalist Consult  Once     Provider:  (Not yet assigned)    04/02/20 0903    04/02/20 0800  Restraints Non-Violent or Non-Self Destructive  Calendar Day      04/02/20 0820    04/02/20 0757  fentaNYL citrate (PF) (SUBLIMAZE) injection 100 mcg  Every 1 Hour PRN      04/02/20 0758    04/02/20 0600  CT Head Without Contrast  1 Time Imaging      04/01/20 2145    04/02/20 0600  Transcranial Doppler Ultrasound  Daily      04/01/20 2145 04/02/20 0600  Basic Metabolic Panel  Morning Draw      04/01/20 2257    04/02/20 0600  Magnesium  Morning Draw      04/01/20 2257    04/02/20 0600  Phosphorus  Morning Draw      04/01/20 2257    04/02/20 0600  CBC (No Diff)  Morning Draw      04/01/20 2257    04/02/20 0600  Blood Gas, Arterial  Morning Draw      04/01/20 2257    04/02/20 0400  niMODipine (NYMALIZE) solution 60 mg  Every 4 Hours Scheduled      04/02/20 0211    04/02/20 0400  LORazepam (ATIVAN) injection 2 mg  Once      04/02/20 0356    04/02/20 0337  Blood Gas, Arterial  Once      04/02/20 0334    04/02/20 0300  POC Glucose Once  Once      04/02/20 0258    04/02/20 0000  Neuro Checks  Every 2 Hours      04/01/20 2329    04/01/20 2308  Restraints Non-Violent or Non-Self Destructive  Calendar Day,   Status:  Canceled      04/01/20 2308    04/01/20 2257  ondansetron (ZOFRAN) injection 4 mg  Every 6 Hours PRN      04/01/20 2257 04/01/20 2257  Patient Currently On Electrolyte Replacement Protocol - Please Refer to MAR for Protocol Details  Misc Nursing Order (Specify)  Daily     Comments:  Patient Currently On Electrolyte Replacement Protocol - Please Refer to MAR for Protocol Details    04/01/20 2256    04/01/20 2257  Code Status and Medical Interventions:  Continuous      04/01/20 9463     04/01/20 2257  Place Sequential Compression Device  Once      04/01/20 2256 04/01/20 2257  Maintain Sequential Compression Device  Continuous      04/01/20 2256 04/01/20 2257  Nasogastric Tube Insertion  Once      04/01/20 2256 04/01/20 2255  magnesium sulfate 4 gram infusion - Mg less than or equal to 1mg/dL  As Needed      04/01/20 2256 04/01/20 2255  magnesium sulfate 3 gram infusion (1gm x 3) - Mg 1.1 - 1.5 mg/dL  As Needed      04/01/20 2256 04/01/20 2255  Magnesium Sulfate 2 gram infusion- Mg 1.6 - 1.9 mg/dL  As Needed      04/01/20 2256 04/01/20 2255  potassium chloride (MICRO-K) CR capsule 40 mEq  As Needed      04/01/20 2256 04/01/20 2255  potassium chloride (KLOR-CON) packet 40 mEq  As Needed      04/01/20 2256 04/01/20 2255  potassium chloride 10 mEq in 100 mL IVPB  Every 1 Hour PRN      04/01/20 2256 04/01/20 2249  Ventilator - AC/VC; (18); 60; 5; 500  Continuous      04/01/20 2250 04/01/20 2247  POC Glucose Once  Once      04/01/20 2245 04/01/20 2147  niMODipine (NIMOTOP) capsule 60 mg  Every 4 Hours Scheduled,   Status:  Discontinued      04/01/20 2145 04/01/20 2147  levETIRAcetam in NaCl 0.82% (KEPPRA) IVPB 500 mg  Every 12 Hours Scheduled      04/01/20 2145 04/01/20 2130  midazolam (VERSED) injection 2 mg  Once      04/01/20 2128 04/01/20 2124  XR Abdomen KUB  1 Time Imaging      04/01/20 2123 04/01/20 2115  Blood Gas, Arterial  Once      04/01/20 2112 04/01/20 2105  iopamidol (ISOVUE-370) 76 % injection 100 mL  Once in Imaging      04/01/20 2103 04/01/20 2057  Inpatient Admission  Once      04/01/20 2056 04/01/20 2050  Pulmonology (on-call MD unless specified)  Once     Specialty:  Pulmonary Disease  Provider:  (Not yet assigned)    04/01/20 2049 04/01/20 2035  Manual Differential  Once      04/01/20 2034 04/01/20 2033  XR Chest 1 View  1 Time Imaging      04/01/20 2032 04/01/20 2021  XR Chest 1 View  1 Time Imaging,   Status:  Canceled       04/01/20 2021 04/01/20 2004  Critical Care  Once     Comments:  This order was created via procedure documentation    04/01/20 2003 04/01/20 2003  CT Angiogram Head  1 Time Imaging      04/01/20 2003 04/01/20 2000  Neuro checks  Every 2 Hours     Comments:  AND as needed.    04/01/20 1927 04/01/20 1956  niCARdipine (CARDENE) 25 mg in 250 mL NS (0.1 mg/mL) infusion kit  Titrated      04/01/20 1954 04/01/20 1955  levETIRAcetam in NaCl 0.75% (KEPPRA) IVPB 1,000 mg  Once      04/01/20 1954 04/01/20 1951  Neurosurgery (on-call MD unless specified)  Once     Specialty:  Neurosurgery  Provider:  (Not yet assigned)    04/01/20 1950    04/01/20 1942  Respiratory communication  Once     Comments:  Advance ETT 2cm    04/01/20 1941 04/01/20 1941  Intubation  Once     Comments:  This order was created via procedure documentation    04/01/20 1940 04/01/20 1940  ondansetron (ZOFRAN) injection 4 mg  Once      04/01/20 1938    04/01/20 1939  Nasogastric tube insertion  Once      04/01/20 1938 04/01/20 1928  Ventilator - AC/VC  Continuous,   Status:  Canceled      04/01/20 1927 04/01/20 1927  Initiate Department's Acute Stroke Process (Team D, Code 19, etc.)  Once      04/01/20 1927 04/01/20 1927  Inpatient Neurology Consult Stroke  Once     Specialty:  Neurology  Provider:  (Not yet assigned)    04/01/20 1927 04/01/20 1927  Inpatient Neurology Consult Stroke  Once     Specialty:  Neurology  Provider:  (Not yet assigned)    04/01/20 1927    04/01/20 1927  Measure Weight  Once      04/01/20 1927 04/01/20 1927  Head of bed 30 degrees or less  Until Discontinued      04/01/20 1927 04/01/20 1927  Cardiac monitoring  Continuous      04/01/20 1927 04/01/20 1927  Continuous Pulse Oximetry  Continuous      04/01/20 1927 04/01/20 1927  Vital signs  Per Hospital Policy     Comments:  Every 30 minutes - Increase frequency based on clinical condition or tPA administration.    04/01/20 1927     04/01/20 1927  No Hypotonic Fluids  Continuous      04/01/20 1927 04/01/20 1927  CT Head Without Contrast Stroke Protocol  1 Time Imaging      04/01/20 1927 04/01/20 1927  XR Chest 1 View  1 Time Imaging     Comments:  Do not delay CT Head to obtain    04/01/20 1927    04/01/20 1927  ECG 12 Lead  Once     Comments:  Do not delay CT Head to obtain    04/01/20 1927 04/01/20 1927  POC Glucose Once  Once      04/01/20 1927 04/01/20 1927  Insert large-bore peripheral IV - Right AC preferred  Once      04/01/20 1927 04/01/20 1927  [place] Draw  Once      04/01/20 1927 04/01/20 1927  CBC & Differential  Once      04/01/20 1927 04/01/20 1927  Comprehensive Metabolic Panel  Once      04/01/20 1927 04/01/20 1927  Protime-INR  Once      04/01/20 1927 04/01/20 1927  aPTT  Once      04/01/20 1927 04/01/20 1927  Troponin  Once      04/01/20 1927 04/01/20 1927  Type & Screen  Once      04/01/20 1927 04/01/20 1927  NPO Diet  Diet Effective Now      04/01/20 1927 04/01/20 1927  Nursing swallow assessment  Once      04/01/20 1927 04/01/20 1927  Light Blue Top  PROCEDURE ONCE      04/01/20 1927 04/01/20 1927  Green Top (Gel)  PROCEDURE ONCE      04/01/20 1927 04/01/20 1927  Lavender Top  PROCEDURE ONCE      04/01/20 1927 04/01/20 1927  Gold Top - SST  PROCEDURE ONCE      04/01/20 1927 04/01/20 1927  CBC Auto Differential  PROCEDURE ONCE      04/01/20 1927 04/01/20 1926  sodium chloride 0.9 % flush 10 mL  As Needed      04/01/20 1927 04/01/20 1926  succinylcholine (ANECTINE) injection 95.4 mg  Once,   Status:  Discontinued      04/01/20 1924 04/01/20 1926  etomidate (AMIDATE) injection 19.08 mg  Once      04/01/20 1924 04/01/20 1926  propofol (DIPRIVAN) infusion 10 mg/mL 100 mL  Titrated      04/01/20 1924 04/01/20 1915  succinylcholine (ANECTINE) injection 100 mg  Once      04/01/20 1943    Unscheduled  Oxygen Therapy- Nasal Cannula; 2 LPM; Titrate for SPO2: equal to  "or greater than, 94%  Continuous PRN     Comments:  2-6 Lpm    04/01/20 1927    Unscheduled  Magnesium  As Needed      04/01/20 2256    Unscheduled  Potassium  As Needed      04/01/20 2256                        Physician Progress Notes (last 24 hours) (Notes from 04/01/20 1003 through 04/02/20 1003)      Tg Linda MD at 04/02/20 0904                                                        LOS: 1 day   Patient Care Team:  Provider, No Known as PCP - General    Chief Complaint:  F/up mechanical ventilation, subarachnoid hemorrhage, ICU care medical problems listed below    Subjective   Interval History  I reviewed the admission note, progress notes, PMH, PSH, Family hx, social history, imagings and prior records on this admission, summarized the finding in my note and formulated a transition of care plan.     No significant secretions from the ET tube.  Was extremely agitated prior to assessing her.  No reports of seizure activities.    REVIEW OF SYSTEMS:   Cannot obtain due to mechanical ventilation.    Ventilator/Non-Invasive Ventilation Settings (From admission, onward)     Start     Ordered    04/01/20 2249  Ventilator - AC/VC; (18); 60; 5; 500  Continuous     Question Answer Comment   Vent Mode AC/VC    Breath rate  18   FiO2 60    PEEP 5    Tidal Volume 500        04/01/20 2250    04/01/20 1928  Ventilator - AC/VC  Continuous,   Status:  Canceled     Question:  Vent Mode  Answer:  AC/VC    04/01/20 1927                      Physical Exam:     Vital Signs  Temp:  [97.8 °F (36.6 °C)-100.3 °F (37.9 °C)] 97.8 °F (36.6 °C)  Heart Rate:  [] 80  Resp:  [8-22] 22  BP: (110-190)/() 110/67  FiO2 (%):  [30 %-60 %] 30 %    Intake/Output Summary (Last 24 hours) at 4/2/2020 0904  Last data filed at 4/2/2020 0600  Gross per 24 hour   Intake 507 ml   Output 550 ml   Net -43 ml     Flowsheet Rows      First Filed Value   Admission Height  154.9 cm (61\") Documented at 04/01/2020 1913   Admission Weight  63.6 kg " (140 lb 3.2 oz) Documented at 04/01/2020 1913          General Appearance:   On mechanical ventilation, sedated.   ENMT:  ET tube 7.  Moist tongue with no thrush.  Normal external ears.   Eyes:  Pupils equals and reactive to light.  Injected conjunctiva.   Neck:   Trachea midline. No thyromegaly.   Lungs:    Clear to auscultation,respirations regular, even and non          labored    Heart:   Regular rhythm and normal rate, normal S1 and S2, no         murmur   Skin:   No rash   Abdomen:    Soft. No tenderness. No HSM.  Positive bowel sounds   Neuro:  Sedated.  Does not follow commands.  Otherwise cannot assess due to sedatives.   Extremities:  Warm extremities and well-perfused.  No cyanosis, clubbing or edema          Results Review:        Results from last 7 days   Lab Units 04/02/20 0455 04/01/20 1930   SODIUM mmol/L 132* 137   POTASSIUM mmol/L 3.4* 3.3*   CHLORIDE mmol/L 95* 99   CO2 mmol/L 22.9 23.1   BUN mg/dL 10 16   CREATININE mg/dL 0.52* 0.56*   GLUCOSE mg/dL 137* 217*   CALCIUM mg/dL 8.9 8.4*     Results from last 7 days   Lab Units 04/01/20 1930   TROPONIN T ng/mL <0.010     Results from last 7 days   Lab Units 04/02/20  0455 04/01/20 1930   WBC 10*3/mm3 13.25* 11.81*   HEMOGLOBIN g/dL 11.6* 11.5*   HEMATOCRIT % 34.3 35.0   PLATELETS 10*3/mm3 200 272     Results from last 7 days   Lab Units 04/01/20 1930   INR  0.93   APTT seconds 25.1           I reviewed the patient's new clinical results.  I personally viewed and interpreted the patient's CXR        Medication Review:     levETIRAcetam 500 mg Intravenous Q12H   LORazepam 2 mg Intravenous Once   niMODipine 60 mg Oral Q4H   ondansetron 4 mg Intravenous Once   succinylcholine 100 mg Intravenous Once         niCARdipine 5-15 mg/hr Last Rate: 5 mg/hr (04/01/20 2128)   propofol 5-50 mcg/kg/min Last Rate: 50 mcg/kg/min (04/02/20 0859)       Diagnostic imaging:  I personally and independently reviewed the following images:  CT brain 4/2/2020:  Diffuse  subarachnoid hemorrhage.      CXR 4/1/2020: ET tube in good position.  No pulmonary infiltrates.  Calcified granuloma in the right lower lobe.     Assessment     1. Acute spontaneous subarachnoid hemorrhage, suspected aneurysmal bleed  2. Respiratory failure due to inability to protect the airways, on mechanical ventilation  3. Hypertensive emergency on admission  4. Hypokalemia  5. Mild leukocytosis, likely stress-induced  6. Anemia    All problems new to me      Plan     Mechanical ventilation management: Adjusted the flow rate by increasing it to 70 and increase the respiratory rate to 12.  Continue on mechanical ventilation for now awaiting cerebral angiogram and improvement of mental status.    Plan for cerebral angiogram per neurosurgery to evaluate for aneurysm.    Increased sedation with propofol to max dose and will provide with fentanyl 100 mcg every hour as needed while on the ventilator.    Nicardipine drip to keep systolic blood pressure less than 140    Replace potassium    Nimodipine through core track to prevent vasospasm    Keppra for seizure prophylaxis    SCD for DVT prophylaxis        Tg Linda MD  04/02/20  09:04      Time: Critical care 35 min      This note was dictated utilizing Dragon dictation    Electronically signed by Tg Linda MD at 04/02/20 0942          Consult Notes (last 24 hours) (Notes from 04/01/20 1004 through 04/02/20 1004)      Gavin Munson MD at 04/01/20 2135      Consult Orders    1. Neurosurgery (on-call MD unless specified) [[id number]] ordered by Jacob Yanes MD at 04/01/20 1950                  Patient Care Team:  Provider, No Known as PCP - General    Chief complaint coma    Subjective .     History of present illness: This patient apparently had a mild headache yesterday evening and took some sleeping medication and went to bed.  Subsequent to that she was doing okay today until about 4:00 when she complained of feeling bad.  Subsequent to that very  rapidly she suffered the acute onset of a severe headache and neck ache.  She then began vomiting.  After that she became essentially nonresponsive.  She was transported to the emergency room where she was found to be decerebrate posturing on arrival.  Her family says she does not have any other medical problems but does admit that she had does not see a doctor on a regular basis.    Review of Systems  Review of systems could not be obtained due to   patient unresponsive.    History  No past medical history on file., No past surgical history on file., No family history on file.,   Social History     Tobacco Use   • Smoking status: Not on file   Substance Use Topics   • Alcohol use: Not on file   • Drug use: Not on file   ,   (Not in a hospital admission) and Allergies:  Patient has no allergy information on record.    Objective     Vital Signs   Temp:  [99.6 °F (37.6 °C)] 99.6 °F (37.6 °C)  Heart Rate:  [73-94] 94  Resp:  [8] 8  BP: (139-190)/() 145/88    Physical Exam:   Physical Exam   Constitutional: She appears well-developed and well-nourished.   HENT:   Head: Normocephalic and atraumatic.   Neck: Normal range of motion.   Cardiovascular: Normal rate.   Pulmonary/Chest: Effort normal.   Abdominal: Soft.   Musculoskeletal: Normal range of motion.   Vitals reviewed.       Neurologic Exam     Cranial Nerves     CN III, IV, VI   Right pupil: Size: 3 mm. Reactivity: non-reactive.   Left pupil: Size: 4 mm. Reactivity: non-reactive.   Vestibulo-ocular reflex: present    CN V   Right corneal reflex: normal  Left corneal reflex: normal    CN IX, X   Right gag reflex: normal  Left gag reflex: normal  The patient localizes with the left arm to central pain.       Results Review:   I reviewed the patient's new clinical results.  A CT and CT angiogram were reviewed.  These do show a diffuse subarachnoid hemorrhage with interventricular hemorrhage.  The temporal horns are little bit generous but there is still  subarachnoid spaces over the convexities.  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      Assessment/Plan       Acute spontaneous subarachnoid intracranial hemorrhage (CMS/HCC)      I had an extended discussion with the patient's family about the situation.  I explained the various complicating factors of a subarachnoid hemorrhage including the possibility of rebleeding, vasospasm, and the development of hydrocephalus.  I had initially planned to put a drain in her tonight but her clinical exam is actually improved from arrival and so we may be able to avoid 1.  I explained that we will check another CT in the morning to see if there is any further bleeding and also to check the size of the ventricles.  Depending on what that shows will make further decisions from there but she will likely need a catheter angiogram and possible coiling tomorrow.  It may not be possible to coil the anterior communicating aneurysm which is likely the one that bled.  I also explained that she is a Hunt Tobin grade 5 and a Salazar grade 4 subarachnoid hemorrhage and that the mortality from subarachnoid hemorrhage of these grades is at least 80% if not higher.    I discussed the patients findings and my recommendations with patient and family    Gavin Munson MD  04/01/20  21:35          Electronically signed by Gavin Munson MD at 04/01/20 4875

## 2020-04-02 NOTE — OP NOTE
Preoperative diagnosis: Increased ICP secondary to hydrocephalus and subarachnoid hemorrhage    Postoperative diagnosis: Same    Procedure performed: Placement of a left frontal external ventricular drain    Surgeon: Gavin Munson M.D.    Asst.: None    Estimated blood loss, crystalloid, colloid, blood: Please see anesthesia record    Material to lab:   CSF    Drains: External ventricular drain using a 4.8 mm catheter    Complications: None    Indications for the procedure: This is a lady with a previous subarachnoid hemorrhage.  She had a coiling earlier today which did involve some further leakage from the aneurysm.  Because of that there was some casting of the lateral ventricles particularly on the right side and increased size of the ventricles.  In addition subarachnoid spaces that have been present over the convexities earlier were gone.  Consequently was thought that she had an obstructive hydrocephalus secondary to subarachnoid hemorrhage and we elected to place a drain.    Operative summary:  The patient was brought into the operating room and placed under general endotracheal anesthesia using intravenous and inhalational agents.  The patient was then positioned on the operating table in the supine position.  All pressure points were padded including peripheral points of entrapment.  The left front quarter of the head was shaved free of hair and then prepped with ChloraPrep.  It was then allowed to dry for 3 minutes and then draped with a plastic sheet with a donut hole in the middle.  The incision was outlined in the interpupillary line over the coronal suture and then injected with 5 cc of 1/8% Marcaine 1 200,000 epinephrine solution.  A 1-1/2 cm incision was made in that region and then a twist drill craniotomy was performed in the left frontal region.  The dura was opened using a 18-gauge spinal needle and then a 4.8 mm trauma catheter was placed into the lateral ventricle frontal horn.  This had the  immediate return of CSF under extremely high pressure.  It was allowed to drain down quite a bit and then the catheter was tunneled subcutaneously.  The incision was then closed with a Nurolon suture then the catheter was sewn into place using silk sutures.  A Biopatch was placed around the catheter and Neosporin over the incision and then a 4 x 4 was used to lay over the incision and the entire thing was held in place with a Tegaderm.  The patient was taken to the recovery room in stable condition.  There were no apparent complications and the sponge, instrument and needle counts were correct at the end of the procedure.

## 2020-04-02 NOTE — PROGRESS NOTES
I was called to the recovery room because the ICP was running in the low 40s in spite of drainage.  I had also given 100 g of mannitol earlier which did not help.  I tried to flush the drain but it flushed easily and did not appear to be clotted off.  I sent the patient for a stat CT scan of her head which shows that the drain is in good position in the left lateral ventricle is almost completely collapsed except for blood that remains in the ventricle itself.  There is also some contrast and blood in the right lateral ventricle but no evidence of CSF.  There is beginning to be a glasslike appearance to the cortical subcortical borders over the convexities.    I examined the patient her pupils are both 8 mm and nonreactive.  She has no corneal reflexes and no oculocephalic reflex.  She has no gag.  There is no response to central pain.  I did not do an apnea test.    I talked to the patient's son and explained to him that I believe the patient is brain dead at this point.  Because this is essentially a new event and her clinical course I recommended that we continue supportive care at least overnight and then plan to reevaluate her in the morning.  If that exam still shows brain death then I think a more definitive discussion can be had.  In addition her systolic blood pressure is only around 80.  We will also notify [facility].  The family requested to come to see the patient and I told him that I would have the  call them.

## 2020-04-02 NOTE — PROGRESS NOTES
LOS: 1 day   Patient Care Team:  Provider, No Known as PCP - General    Chief Complaint: Patient currently sedated, ventilated. F/U visit for aneurysmal SAH.    Subjective     History of Present Illness    Subjective    History taken from: patient RN Patient unable to give history due to patient sedation status.    Objective     Vital Signs  Temp:  [97.8 °F (36.6 °C)-100.3 °F (37.9 °C)] 97.8 °F (36.6 °C)  Heart Rate:  [] 80  Resp:  [8-22] 22  BP: (110-190)/() 110/67  FiO2 (%):  [30 %-60 %] 30 %    Objective     Intubated, ventilated.  Agitated, moving all four extremities, and attempted to self-extubate while off of sedation. earlier this shift per RN.   PERRL, opens eyes, withdraws to pain while off sedation      Results Review:     I reviewed the patient's new clinical results.  I reviewed the patient's new imaging results and agree with the interpretation.  Discussed with Dr. Munson     CRANIAL CT SCAN WITHOUT CONTRAST 4/02/2020    COMPARISON: 04/01/2020    Extensive bilateral SAH noted bilaterally. SAH appears slightly diminished however the IVH has slightly increased. The caliber of the ventricles is unchanged. No new hemorrhage appreciated.      .  Results from last 7 days   Lab Units 04/02/20  0455 04/01/20 1930   WBC 10*3/mm3 13.25* 11.81*   HEMOGLOBIN g/dL 11.6* 11.5*   HEMATOCRIT % 34.3 35.0   PLATELETS 10*3/mm3 200 272     .  Results from last 7 days   Lab Units 04/02/20  0455 04/01/20 1930   SODIUM mmol/L 132* 137   POTASSIUM mmol/L 3.4* 3.3*   CHLORIDE mmol/L 95* 99   CO2 mmol/L 22.9 23.1   BUN mg/dL 10 16   CREATININE mg/dL 0.52* 0.56*   GLUCOSE mg/dL 137* 217*   CALCIUM mg/dL 8.9 8.4*     .  Results from last 7 days   Lab Units 04/01/20 1930   INR  0.93   APTT seconds 25.1       Medication Review:     Continue Keppra, Nimodipine    Assessment/Plan       Acute spontaneous subarachnoid intracranial hemorrhage (CMS/HCC)    CTA head shows 2 aneurysms. 1) 3.0 x 2.7 mm non-ruptured,  wide-necked saccular aneurysm proximal to ICA bifurcation in the opthalmic segment and 2) 3.9 x 3.0 ruptured appearing saccular anterior communicating artery aneurysm with large ovoid collection of hemorrhage contiguous with upper margin of aneurysm.       Hypertension     Respiratory failure     Assessment & Plan        Intensivists are managing HTN, Vent, all medical issues     Consult Dr. Watkins, neurovascular interventionalist for angiogram and possible aneurysm coiling per Dr. Munson' request.     Destiny Mooney, APRN  04/02/20  08:57

## 2020-04-02 NOTE — OP NOTE
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    Procedure: Cerebral Embolization    Surgeon: Henri Gordon MD    Findings: 2.5 x 2.5 x 3.0 mm ruptured acom aneurysm pointing medially and posteriorly. Coil occluded with 5 detachable coils. Re-ruptured during the 1st coil placement. Secured subsequently, but ICP elevated and intracranial runoff delayed.    General ETT Anesthesia    No closure device placed    EBL: 200cc    Complication: Intra-procedural rupture.

## 2020-04-02 NOTE — BRIEF OP NOTE
VENTRICULAR PERITONEAL SHUNT INSERTION  Progress Note    Denisse Reyes  4/2/2020    Pre-op Diagnosis:   Hydrocephalus       Post-Op Diagnosis Codes:  Same    Procedure/CPT® Codes:      Procedure(s):  PLACEMENT OF EXTERNAL VENTRICULAR DRAIN    Surgeon(s):  Gavin Munson MD    Anesthesia: General    Staff:   Circulator: Dana Zaldivar RN  Scrub Person: Carmen Lopez    Estimated Blood Loss: none    Urine Voided: * No values recorded between 4/2/2020  2:38 PM and 4/2/2020  3:13 PM *    Specimens:                Specimens     ID Source Type Tests Collected By Collected At Frozen?      1 Brain Cerebrospinal Fluid · GLUCOSE, CSF  · PROTEIN, CSF  · CELL COUNT WITH DIFFERENTIAL, CSF  · CULTURE, CSF   Gavin Munson MD 4/2/20 1500      Description: Spinal Fluid                Drains:   NG/OG Tube Nasogastric 18 Fr Right nostril (Active)   Placement Verification X-ray 4/2/2020  8:00 AM   Site Assessment Clean;Dry;Intact 4/2/2020  8:00 AM   Securement taped to nostril center 4/2/2020  8:00 AM   Secured at (cm) 65 4/2/2020  8:00 AM   Dressing Intervention New dressing 4/1/2020  7:40 PM   Status Clamped 4/2/2020  8:00 AM   Drainage Appearance Bile 4/2/2020  8:00 AM   Tube Feeding Residual (mL) 5 mL 4/2/2020  4:00 AM   Tube Feeding Residual Returned (mL) 5 mL 4/2/2020  4:00 AM       EVD (External Ventricular Drain) 04/02/20 1509 (Active)       Urethral Catheter 16 Fr. (Active)   Site Assessment Skin intact;Clean 4/2/2020  1:22 PM       Findings: Increased ICP    Complications: None      Gavin Munson MD     Date: 4/2/2020  Time: 15:18

## 2020-04-02 NOTE — PROGRESS NOTES
"                                              LOS: 1 day   Patient Care Team:  Provider, No Known as PCP - General    Chief Complaint:  F/up mechanical ventilation, subarachnoid hemorrhage, ICU care medical problems listed below    Subjective   Interval History  I reviewed the admission note, progress notes, PMH, PSH, Family hx, social history, imagings and prior records on this admission, summarized the finding in my note and formulated a transition of care plan.     No significant secretions from the ET tube.  Was extremely agitated prior to assessing her.  No reports of seizure activities.    REVIEW OF SYSTEMS:   Cannot obtain due to mechanical ventilation.    Ventilator/Non-Invasive Ventilation Settings (From admission, onward)     Start     Ordered    04/01/20 2249  Ventilator - AC/VC; (18); 60; 5; 500  Continuous     Question Answer Comment   Vent Mode AC/VC    Breath rate  18   FiO2 60    PEEP 5    Tidal Volume 500        04/01/20 2250    04/01/20 1928  Ventilator - AC/VC  Continuous,   Status:  Canceled     Question:  Vent Mode  Answer:  AC/VC    04/01/20 1927                      Physical Exam:     Vital Signs  Temp:  [97.8 °F (36.6 °C)-100.3 °F (37.9 °C)] 97.8 °F (36.6 °C)  Heart Rate:  [] 80  Resp:  [8-22] 22  BP: (110-190)/() 110/67  FiO2 (%):  [30 %-60 %] 30 %    Intake/Output Summary (Last 24 hours) at 4/2/2020 0904  Last data filed at 4/2/2020 0600  Gross per 24 hour   Intake 507 ml   Output 550 ml   Net -43 ml     Flowsheet Rows      First Filed Value   Admission Height  154.9 cm (61\") Documented at 04/01/2020 1913   Admission Weight  63.6 kg (140 lb 3.2 oz) Documented at 04/01/2020 1913          General Appearance:   On mechanical ventilation, sedated.   ENMT:  ET tube 7.  Moist tongue with no thrush.  Normal external ears.   Eyes:  Pupils equals and reactive to light.  Injected conjunctiva.   Neck:   Trachea midline. No thyromegaly.   Lungs:    Clear to auscultation,respirations " regular, even and non          labored    Heart:   Regular rhythm and normal rate, normal S1 and S2, no         murmur   Skin:   No rash   Abdomen:    Soft. No tenderness. No HSM.  Positive bowel sounds   Neuro:  Sedated.  Does not follow commands.  Otherwise cannot assess due to sedatives.   Extremities:  Warm extremities and well-perfused.  No cyanosis, clubbing or edema          Results Review:        Results from last 7 days   Lab Units 04/02/20  0455 04/01/20 1930   SODIUM mmol/L 132* 137   POTASSIUM mmol/L 3.4* 3.3*   CHLORIDE mmol/L 95* 99   CO2 mmol/L 22.9 23.1   BUN mg/dL 10 16   CREATININE mg/dL 0.52* 0.56*   GLUCOSE mg/dL 137* 217*   CALCIUM mg/dL 8.9 8.4*     Results from last 7 days   Lab Units 04/01/20 1930   TROPONIN T ng/mL <0.010     Results from last 7 days   Lab Units 04/02/20  0455 04/01/20 1930   WBC 10*3/mm3 13.25* 11.81*   HEMOGLOBIN g/dL 11.6* 11.5*   HEMATOCRIT % 34.3 35.0   PLATELETS 10*3/mm3 200 272     Results from last 7 days   Lab Units 04/01/20 1930   INR  0.93   APTT seconds 25.1           I reviewed the patient's new clinical results.  I personally viewed and interpreted the patient's CXR        Medication Review:     levETIRAcetam 500 mg Intravenous Q12H   LORazepam 2 mg Intravenous Once   niMODipine 60 mg Oral Q4H   ondansetron 4 mg Intravenous Once   succinylcholine 100 mg Intravenous Once         niCARdipine 5-15 mg/hr Last Rate: 5 mg/hr (04/01/20 2128)   propofol 5-50 mcg/kg/min Last Rate: 50 mcg/kg/min (04/02/20 0859)       Diagnostic imaging:  I personally and independently reviewed the following images:  CT brain 4/2/2020:  Diffuse subarachnoid hemorrhage.      CXR 4/1/2020: ET tube in good position.  No pulmonary infiltrates.  Calcified granuloma in the right lower lobe.     Assessment     1. Acute spontaneous subarachnoid hemorrhage, suspected aneurysmal bleed  2. Respiratory failure due to inability to protect the airways, on mechanical ventilation  3. Hypertensive  emergency on admission  4. Hypokalemia  5. Mild leukocytosis, likely stress-induced  6. Anemia    All problems new to me      Plan     Mechanical ventilation management: Adjusted the flow rate by increasing it to 70 and increase the respiratory rate to 12.  Continue on mechanical ventilation for now awaiting cerebral angiogram and improvement of mental status.    Plan for cerebral angiogram per neurosurgery to evaluate for aneurysm.    Increased sedation with propofol to max dose and will provide with fentanyl 100 mcg every hour as needed while on the ventilator.    Nicardipine drip to keep systolic blood pressure less than 140    Replace potassium    Nimodipine through core track to prevent vasospasm    Keppra for seizure prophylaxis    SCD for DVT prophylaxis        Tg Linda MD  04/02/20  09:04      Time: Critical care 35 min      This note was dictated utilizing Dragon dictation

## 2020-04-02 NOTE — PROGRESS NOTES
This patient had coiling of an anterior communicating artery aneurysm earlier today.  Prior to the aneurysm being completely obliterated there was some further bleeding.  A stat CT done at the conclusion of the procedure showed more bleeding and casting of the lateral ventricle particularly on the right.  Because of that the ICP had increased and we elected to proceed with an emergency placement of an external ventricular drain.  This was discussed with the patient's family prior to the surgery being done.

## 2020-04-02 NOTE — CONSULTS
Patient Care Team:  Provider, Pia Known as PCP - General    Chief complaint coma    Subjective .     History of present illness: This patient apparently had a mild headache yesterday evening and took some sleeping medication and went to bed.  Subsequent to that she was doing okay today until about 4:00 when she complained of feeling bad.  Subsequent to that very rapidly she suffered the acute onset of a severe headache and neck ache.  She then began vomiting.  After that she became essentially nonresponsive.  She was transported to the emergency room where she was found to be decerebrate posturing on arrival.  Her family says she does not have any other medical problems but does admit that she had does not see a doctor on a regular basis.    Review of Systems  Review of systems could not be obtained due to   patient unresponsive.    History  No past medical history on file., No past surgical history on file., No family history on file.,   Social History     Tobacco Use   • Smoking status: Not on file   Substance Use Topics   • Alcohol use: Not on file   • Drug use: Not on file   ,   (Not in a hospital admission) and Allergies:  Patient has no allergy information on record.    Objective     Vital Signs   Temp:  [99.6 °F (37.6 °C)] 99.6 °F (37.6 °C)  Heart Rate:  [73-94] 94  Resp:  [8] 8  BP: (139-190)/() 145/88    Physical Exam:   Physical Exam   Constitutional: She appears well-developed and well-nourished.   HENT:   Head: Normocephalic and atraumatic.   Neck: Normal range of motion.   Cardiovascular: Normal rate.   Pulmonary/Chest: Effort normal.   Abdominal: Soft.   Musculoskeletal: Normal range of motion.   Vitals reviewed.       Neurologic Exam     Cranial Nerves     CN III, IV, VI   Right pupil: Size: 3 mm. Reactivity: non-reactive.   Left pupil: Size: 4 mm. Reactivity: non-reactive.   Vestibulo-ocular reflex: present    CN V   Right corneal reflex: normal  Left corneal reflex: normal    CN IX, X   Right  gag reflex: normal  Left gag reflex: normal  The patient localizes with the left arm to central pain.       Results Review:   I reviewed the patient's new clinical results.  A CT and CT angiogram were reviewed.  These do show a diffuse subarachnoid hemorrhage with interventricular hemorrhage.  The temporal horns are little bit generous but there is still subarachnoid spaces over the convexities.  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      Assessment/Plan       Acute spontaneous subarachnoid intracranial hemorrhage (CMS/HCC)      I had an extended discussion with the patient's family about the situation.  I explained the various complicating factors of a subarachnoid hemorrhage including the possibility of rebleeding, vasospasm, and the development of hydrocephalus.  I had initially planned to put a drain in her tonight but her clinical exam is actually improved from arrival and so we may be able to avoid 1.  I explained that we will check another CT in the morning to see if there is any further bleeding and also to check the size of the ventricles.  Depending on what that shows will make further decisions from there but she will likely need a catheter angiogram and possible coiling tomorrow.  It may not be possible to coil the anterior communicating aneurysm which is likely the one that bled.  I also explained that she is a Hunt Tobin grade 5 and a Salazar grade 4 subarachnoid hemorrhage and that the mortality from subarachnoid hemorrhage of these grades is at least 80% if not higher.    I discussed the patients findings and my recommendations with patient and family    Gavin Munson MD  04/01/20  21:35

## 2020-04-02 NOTE — ANESTHESIA POSTPROCEDURE EVALUATION
Patient: Denisse Reyes    Procedure Summary     Date:  04/02/20 Room / Location:   [facility] OR 21 / [facility] OR; [facility] OR 19 INV /  [facility] HYBRID OR 18/19    Anesthesia Start:  1005 Anesthesia Stop:  1328    Procedures:       Cerebral Embolization (N/A )      VENTRICULAR PERITONEAL SHUNT INSERTION (N/A Head) Diagnosis:       SAH (subarachnoid hemorrhage) (CMS/HCC)      (SAH (subarachnoid hemorrhage) (CMS/HCC) [I60.9])    Surgeon:  Gavin Munson MD; Henri Gordon MD Provider:  Luis Angel Mott MD    Anesthesia Type:  general ASA Status:  4 - Emergent          Anesthesia Type: general    Vitals  Vitals Value Taken Time   BP 84/55 4/2/2020  2:30 PM   Temp 36.7 °C (98.1 °F) 4/2/2020  1:22 PM   Pulse 86 4/2/2020  2:32 PM   Resp 12 4/2/2020  1:30 PM   SpO2 97 % 4/2/2020  2:33 PM   Vitals shown include unvalidated device data.        Post Anesthesia Care and Evaluation    Patient location during evaluation: PACU  Patient participation: complete - patient cannot participate    Cardiovascular status: acceptable  Respiratory status: acceptable

## 2020-04-02 NOTE — PERIOPERATIVE NURSING NOTE
Dr. Munson at bedside able to evaluate patient and back flushed ICP drain without any drainage flowing afterwards. ICP readings are still about 40. He instructed another STAT head CT then go straight up to CCU from there. I was able to call report to CCU on the way to CT. Dr. Munson was also going to contact patient's son because he requested an update.

## 2020-04-02 NOTE — PERIOPERATIVE NURSING NOTE
Contacted Dr. Munson about patients elevated ICP of 37. See new orders of mannitol and new level for ICP drain.

## 2020-04-02 NOTE — H&P
"    [facility]  [phone]  Radhames Zavala MD      Subjective   LOS: 0 days     54-year-old female who has no significant known medical history.  At about 4 PM today developed nausea with vomiting and headache.  She went to lay down.  After couple of hours she woke up with worsening symptoms.  Subsequently she lost consciousness.  EMS was called and in the meantime family initiated CPR.  Please note it is not clear that the patient had respiratory or cardiac arrest.  Family is unsure and was simply scared and initiated CPR.  Subsequent to the \"CPR\" she woke up.  She stated that she felt better for a few minutes and thereafter started having nausea and vomiting.  By this time EMS arrived.  She was brought to the hospital.  She continued to have vomiting and was intubated for airway protection.  Initial blood pressure recorded is 190/100.  CT head shows subarachnoid hemorrhage.  She has been evaluated by neurosurgery.  Initial plans was for a ventricular drain but this is now been deferred until evaluation by CT head in the morning.  Patient has a history of a sister dying of hemorrhagic bleed.  Also possibly patient's mother  of a hemorrhagic bleed.  Unknown if these were aneurysmal bleeds or subarachnoid hemorrhages.    Patient does not have any history of fever or chills.  No reported cough.  Family states there has been no recent travel by the patient or close contacts.  Patient has been self isolating since January.    Denisse Reyes  has no alcohol history on file.,  has no tobacco history on file.     Past Hx:  has no past medical history on file.  Surg Hx:  has no past surgical history on file.  FH: family history is not on file.  SH:  has no tobacco, alcohol, and drug history on file.    No medications prior to admission.     Allergies not on file    Review of Systems   Unable to perform ROS: Intubated     Vital Signs past 24hrs  BP range: BP: (122-190)/() 130/71  Pulse range: Heart " Rate:  [73-94] 87  Resp rate range: Resp:  [8] 8  Temp range: Temp (24hrs), Av °F (37.8 °C), Min:99.6 °F (37.6 °C), Max:100.3 °F (37.9 °C)    Oxygen range: SpO2:  [100 %] 100 %;  ;      63.6 kg (140 lb 3.2 oz); Body mass index is 26.49 kg/m².  No intake/output data recorded.    Adult female who is intubated.  She is currently sedated with propofol.  With vigorous stimulation she does respond by moving her arms spontaneously and towards the ET tube.  Her legs move slightly but not as vigorously.  Pupils are equal and reactive.  Oral ET tube noted.  JVP not elevated trachea midline.  Lungs reveal bilateral air entry clear to auscultation no rales rhonchi wheeze.  Heart examination S1-S2 present rhythm regular no murmurs.  No edema in lower extremities.  Abdomen is soft nontender bowel sounds present no liver spleen enlargement.  No peripheral cyanosis clubbing.  No cervical, axillary, inguinal adenopathy.  As stated pupils equal and reactive.  Moves upper extremities with vigorous stimulation.  Lower extremities minimal movement.  No seizure-like activity noted.  Currently on propofol.  Detailed neuro exam was not possible.  GCS 5.    Results Review:    I have reviewed the laboratory and imaging data from current admission. My annotations are as noted in assessment and plan.    Medication Review:  I have reviewed the current MAR. My annotations are as noted in assessment and plan.    Plan   PCCM Problems  Subarachnoid hemorrhage  Suspected aneurysmal bleed  Acute resp failure - intubated for airway protection  Acute hypokalemia  Hypertensive emergency on admission      Plan of Treatment  Already seen by NS. Plan is CT head in AM and BP control for SBP < 140. Already has orders for Nicardipine and Nimotop. Also on Keppra. CTA ordered and pending.  Suspected aneurysmal bleed.    Acute respiratory failure with intubation for airway protection.  Ventilator settings adjusted.  Initial blood gas reviewed.  Repeat ABGs  in the morning.  Chest x-ray looks clear.    Acute hypokalemia will replace.    Hypertensive emergency on admission.  Treat with nicardipine as ordered.  Goal systolic blood pressure is less than 140.    I spent 35 mins critical care time in care of this patient outside of any procedures.     Part of this note may be an electronic transcription/translation of spoken language to printed text using the Dragon Dictation System.

## 2020-04-02 NOTE — PLAN OF CARE
Patient to surgery x 2 today with complications.  Patient to unit with EVD which is not draining and Dr Munson aware. Dr. Munson spoke with family concerning prognosis and  family considering withdrawing care tomorrow.  MAY notified and they will be contacting family. Spoke with Eleanor Silva today about son and   being allowed at bedside if vent is removed due to fact that  only speaks Cantonese with special dialect and is unable to understand the  ipad. She said that that was acceptable as  would not understand what was happening.

## 2020-04-03 NOTE — PROGRESS NOTES
[facility] NEUROSURGERY PROGRESS NOTE    PATIENT IDENTIFICATION:   Name:  Denisse Reyes      MRN:  [id number]     54 y.o.  female               CC: SAH/ACOM aneurysm rupture/HCP      Subjective     Interval History: No changes overnight.  Patient unresponsive.  EVD not draining or reading ICP.     ROS:  AYDEN- comatose    Objective     Vital signs in last 24 hours:  Temp:  [94.4 °F (34.7 °C)-98.5 °F (36.9 °C)] 98.5 °F (36.9 °C)  Heart Rate:  [69-97] 90  Resp:  [12] 12  BP: ()/(43-79) 84/61  Arterial Line BP: ()/(37-66) 104/49  FiO2 (%):  [21 %-30 %] 21 %  ICP ranges- unreadable    Intake/Output this shift:  No intake/output data recorded.  EVD output-7 cc    Intake/Output last 3 shifts:  I/O last 3 completed shifts:  In: 3777 [I.V.:2727; IV Piggyback:1050]  Out: 53275 [Urine:08201; Other:7; Blood:250]    LABS:  Results from last 7 days   Lab Units 04/02/20  0455 04/01/20  1930   WBC 10*3/mm3 13.25* 11.81*   HEMOGLOBIN g/dL 11.6* 11.5*   HEMATOCRIT % 34.3 35.0   PLATELETS 10*3/mm3 200 272     Results from last 7 days   Lab Units 04/03/20  0651 04/02/20  2350 04/02/20  1758  04/01/20  1930   SODIUM mmol/L 170* 154* 134*   < > 137   POTASSIUM mmol/L 2.7* 3.5 4.6   < > 3.3*   CHLORIDE mmol/L >140* 126* 105   < > 99   CO2 mmol/L 14.5* 18.8* 17.4*   < > 23.1   BUN mg/dL 9 8 7   < > 16   CREATININE mg/dL 0.72 0.57 0.65   < > 0.56*   CALCIUM mg/dL 10.4 9.5 8.5*   < > 8.4*   BILIRUBIN mg/dL  --   --   --   --  0.3   ALK PHOS U/L  --   --   --   --  95   ALT (SGPT) U/L  --   --   --   --  36*   AST (SGOT) U/L  --   --   --   --  33*   GLUCOSE mg/dL 133* 157* 143*   < > 217*    < > = values in this interval not displayed.        IMAGING STUDIES:  CTH- CT head unchanged from prior study completed yesterday afternoon.  This shows significant diffuse subarachnoid hemorrhage and intraventricular hemorrhage.  EVD in place.  Coil artifact present as well.  There is diffuse cerebral edema.    I personally viewed and  interpreted the patient's CTH; also reviewed by and discussed with Dr. Munson.    Meds reviewed/changed: Yes    Current Facility-Administered Medications:   •  chlorhexidine (PERIDEX) 0.12 % solution 15 mL, 15 mL, Mouth/Throat, Q12H, Henri Gordon MD, 15 mL at 04/03/20 0852  •  famotidine (PEPCID) injection 20 mg, 20 mg, Intravenous, Q12H, Henri Gordon MD, 20 mg at 04/03/20 0848  •  fentaNYL citrate (PF) (SUBLIMAZE) injection 100 mcg, 100 mcg, Intravenous, Q1H PRN, Henri Gordon MD, 50 mcg at 04/02/20 1130  •  levETIRAcetam in NaCl 0.82% (KEPPRA) IVPB 500 mg, 500 mg, Intravenous, Q12H, Henri Gordon MD, 500 mg at 04/03/20 0848  •  LORazepam (ATIVAN) injection 2 mg, 2 mg, Intravenous, Once, Henri Gordon MD, Stopped at 04/02/20 0500  •  magnesium sulfate 4 gram infusion - Mg less than or equal to 1mg/dL, 4 g, Intravenous, PRN **OR** magnesium sulfate 3 gram infusion (1gm x 3) - Mg 1.1 - 1.5 mg/dL, 1 g, Intravenous, PRN **OR** Magnesium Sulfate 2 gram infusion- Mg 1.6 - 1.9 mg/dL, 2 g, Intravenous, PRN, Henri Gordon MD  •  niCARdipine (CARDENE) 25 mg in 250 mL NS (0.1 mg/mL) infusion kit, 5-15 mg/hr, Intravenous, Titrated, Henri Gordon MD, Last Rate: 50 mL/hr at 04/01/20 2128, 5 mg/hr at 04/01/20 2128  •  norepinephrine (LEVOPHED) 8 mg/250 mL (32 mcg/mL) in sodium chloride 0.9% infusion (premix), 0.02-0.3 mcg/kg/min, Intravenous, Titrated, Radhames Zavala MD, Last Rate: 11.93 mL/hr at 04/03/20 1207, 0.1 mcg/kg/min at 04/03/20 1207  •  ondansetron (ZOFRAN) injection 4 mg, 4 mg, Intravenous, Once, Henri Gordon MD  •  ondansetron (ZOFRAN) injection 4 mg, 4 mg, Intravenous, Q6H PRN, Henri Gordon MD  •  potassium chloride (MICRO-K) CR capsule 40 mEq, 40 mEq, Oral, PRN **OR** potassium chloride (KLOR-CON) packet 40 mEq, 40 mEq, Oral, PRN, 40 mEq at 04/02/20 0653 **OR** potassium chloride 10 mEq in 100 mL IVPB, 10 mEq, Intravenous, Q1H PRN, Henri Gordon MD  •  propofol (DIPRIVAN)  infusion 10 mg/mL 100 mL, 5-50 mcg/kg/min, Intravenous, Titrated, Henri Gordon MD, Stopped at 04/02/20 1400  •  sodium chloride 0.45 % infusion, 100 mL/hr, Intravenous, Continuous, Radhames Zavala MD, Last Rate: 100 mL/hr at 04/03/20 0125, 100 mL/hr at 04/03/20 0125  •  sodium chloride 0.9 % flush 10 mL, 10 mL, Intravenous, PRN, Henri Gordon MD, 10 mL at 04/02/20 2012  •  succinylcholine (ANECTINE) injection 100 mg, 100 mg, Intravenous, Once, Henri Gordon MD      Physical Exam:    General:   Intubated.  Nonresponsive   HEENT:    Core track in place.  ET tube in place.  EVD open     but not draining. blood in tubing  Neck:    Supple    CN: Pupils 6 mm and fixed, abnormal oculocephalic maneuver. No notable facial weakness.  No cough, gag.  No corneal reflex response.  Motor: Flaccid.  No response to painful stimulus  Sensation: No response to painful stimulus   Station and Gait: Not assessed due to comatose status  Coordination: Patient comatose and nonresponsive.  Unable to complete this portion of exam  Extremities:   Wearing SCD    Assessment/Plan     ASSESSMENT:      SAH (subarachnoid hemorrhage) (CMS/HCC)    Acute spontaneous subarachnoid intracranial hemorrhage (CMS/HCC)      PLAN: Patient with no clinical improvement overnight. She is unresponsive with no corneal/cough/gag.  EVD non functional despite being flushed yesterday by Dr. Munson.  There is no hydrocephalus on CT.  There is diffuse cerebral edema, subarachnoid and intraventricular hemorrhage.  She is requiring pressors for blood pressure support.  She has abnormalities on labs.  IV fluids were changed overnight by intensivist.  Mannitol has been discontinued as it may be contributing to the hypernatremia, increased urine output.  Unfortunately, patient is in a moribund situation.  Family will be arriving sometime this afternoon. There are no other SVETA options to improve her situation. If the family is amenable to considering organ donation,  "she will need brain death testing.  Discussed with Dr. Munson.  He defers brain death testing to intensivists and another MD of their choice. I discussed the morning lab rsults with nursing staff. Per our discussion, further labs are pending.    This part is from Dr. Munson.  I was called to the room by the nursing staff because the family had arrived and wanted to talk to me.  I again explained the entire situation to them.  I explained how the subarachnoid hemorrhage was quite extensive when she presented and had an overall mortality of 80 to 90%.  I explained how during the coiling there was a bit more hemorrhage which then resulted in increased pressure in her head.  We attempted the drain in order to try to relieve the pressure but even with complete drainage of the CSF her ICP was still quite high.  She appeared to be brain dead prior to the drain being placed but was definitely brain dead by yesterday afternoon as noted in my progress note.  I also performed another exam finding no evidence of brainstem reflexes.  I explained to them that the patient is not suffering and this is not the result of anything that she or the family did.  There is a strong family history of aneurysms however and I did suggest that her direct children seriously consider being evaluated for any type of vascular anomaly.  Her son was present for the entire discussion and I believe he understood everything that I was telling him.  Nursing was present as well.    I discussed the patients findings and my recommendations with nursing staff and Dr. Munson       LOS: 2 days       Christine Patel, APRN  4/3/2020  12:28    \"Dictated utilizing Dragon dictation\".      "

## 2020-04-03 NOTE — PROGRESS NOTES
LOS: 2 days   Patient Care Team:  Provider, Pia Known as PCP - General    Chief Complaint:  F/up mechanical ventilation, subarachnoid hemorrhage, ICU care medical problems listed below    Subjective   Interval History  Noted the events from yesterday.  She underwent angiogram with finding of 2 cerebral aneurysm and had coiling of 1 aneurysm.  Had worsening neurological status and worsening CT findings with subarachnoid and intraventricular hemorrhage and underwent EVD placement.    She is currently on mechanical ventilation.  On my initial assessment, she was on AC VC with a rate of 12 and was not overbreathing the ventilator.  Later during the day, she dropped her blood pressure.    She was started on mannitol yesterday.  She had 7 L out since yesterday.  Her average urine output per hour is 200 mL as of today.  Patient is unresponsive.  Her pupils are dilated and fixed.  She has no gag or cough reflex.  No fever but actually she became hypothermic and she is on a Claire hugger.    REVIEW OF SYSTEMS:   Cannot obtain due to mechanical ventilation.    Ventilator/Non-Invasive Ventilation Settings (From admission, onward)     Start     Ordered    04/01/20 2249  Ventilator - AC/VC; (18); 60; 5; 500  Continuous     Question Answer Comment   Vent Mode AC/VC    Breath rate  18   FiO2 60    PEEP 5    Tidal Volume 500        04/01/20 2250    04/01/20 1928  Ventilator - AC/VC  Continuous,   Status:  Canceled     Question:  Vent Mode  Answer:  AC/VC    04/01/20 1927                      Physical Exam:     Vital Signs  Temp:  [94.4 °F (34.7 °C)-98.5 °F (36.9 °C)] 98.5 °F (36.9 °C)  Heart Rate:  [69-97] 71  Resp:  [12] 12  BP: ()/(43-77) 102/73  Arterial Line BP: ()/(37-66) 116/66  FiO2 (%):  [21 %-30 %] 21 %    Intake/Output Summary (Last 24 hours) at 4/3/2020 2169  Last data filed at 4/3/2020 0610  Gross per 24 hour   Intake 3270 ml   Output 64555 ml   Net -7187 ml  "    Flowsheet Rows      First Filed Value   Admission Height  154.9 cm (61\") Documented at 04/01/2020 1913   Admission Weight  63.6 kg (140 lb 3.2 oz) Documented at 04/01/2020 1913          General Appearance:   On mechanical ventilation.  Unresponsive.  Not on sedatives.   ENMT:  ET tube 7.  Moist tongue with no thrush.  Normal external ears.   Eyes:  Dilated pupils.  Not reactive to light.  Injected conjunctiva   Neck:   Trachea midline. No thyromegaly.   Lungs:    Clear to auscultation,respirations regular, even and non          labored    Heart:   Regular rhythm and normal rate, normal S1 and S2, no         murmur   Skin:   No rash   Abdomen:    Soft. No tenderness. No HSM.  Positive bowel sounds   Neuro:  Unresponsive.  No reaction to painful or verbal stimuli.   Extremities:  Cold overall.  No cyanosis, clubbing or edema          Results Review:        Results from last 7 days   Lab Units 04/03/20  0651 04/02/20  2350 04/02/20  1758   SODIUM mmol/L 170* 154* 134*   POTASSIUM mmol/L 2.7* 3.5 4.6   CHLORIDE mmol/L >140* 126* 105   CO2 mmol/L 14.5* 18.8* 17.4*   BUN mg/dL 9 8 7   CREATININE mg/dL 0.72 0.57 0.65   GLUCOSE mg/dL 133* 157* 143*   CALCIUM mg/dL 10.4 9.5 8.5*     Results from last 7 days   Lab Units 04/01/20  1930   TROPONIN T ng/mL <0.010     Results from last 7 days   Lab Units 04/02/20  0455 04/01/20 1930   WBC 10*3/mm3 13.25* 11.81*   HEMOGLOBIN g/dL 11.6* 11.5*   HEMATOCRIT % 34.3 35.0   PLATELETS 10*3/mm3 200 272     Results from last 7 days   Lab Units 04/01/20 1930   INR  0.93   APTT seconds 25.1           I reviewed the patient's new clinical results.  I personally viewed and interpreted the patient's CXR        Medication Review:     chlorhexidine 15 mL Mouth/Throat Q12H   famotidine 20 mg Intravenous Q12H   levETIRAcetam 500 mg Intravenous Q12H   LORazepam 2 mg Intravenous Once   mannitol 20 g Intravenous Q4H   ondansetron 4 mg Intravenous Once   succinylcholine 100 mg Intravenous Once "         niCARdipine 5-15 mg/hr Last Rate: 5 mg/hr (04/01/20 7676)   norepinephrine 0.02-0.3 mcg/kg/min Last Rate: 0.08 mcg/kg/min (04/03/20 0647)   propofol 5-50 mcg/kg/min Last Rate: Stopped (04/02/20 1400)   sodium chloride 100 mL/hr Last Rate: 100 mL/hr (04/03/20 0125)       Diagnostic imaging:  I personally and independently reviewed the following images:  CT brain 4/2/2020:  Diffuse subarachnoid hemorrhage.      CXR 4/1/2020: ET tube in good position.  No pulmonary infiltrates.  Calcified granuloma in the right lower lobe.     Assessment     1. 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 Unruptured Left Superior Hypophyseal Aneurysm  3. Acute subarachnoid hemorrhage and intraventricular hemorrhage, secondary to #1  4. Hydrocephalus and elevated intracranial pressure, s/p EVD 4/2/2020  5. Respiratory failure due to inability to protect the airways, on mechanical ventilation  6. Coma  7. Hypothermia  8. Polyuria, suspect related to mannitol.  She may have also developed central DI  9. Shock: Secondary to volume depletion from polyuria and likely central element NEW  10. Hypertensive emergency on admission, resolved  11. Severe Hypokalemia NEW  12. Severe hypernatremia NEW  13. Mild leukocytosis, likely stress-induced  14. Anemia        Plan     Mechanical ventilation management: I lowered the respiratory rate to 4 and patient was capable to overbreathing the ventilator.  I also placed her on pressure support and she was having few shallow breath every now and then.  This speaks against brain death.    Intracranial Doppler discussed with the technician at bedside who indicated likely absence of brain flow in the vertebral artery and MCA and no evidence of vasospasm.  Will await for the official report.    Check serum and urine osmolarity and urine sodium.  This might be related to mannitol or diabetes insipidus.    Start half-normal saline 150 mL/h to replace her volume loss  (averaging 200 mL of urine per hour).    Check BMP every 6 hours    Give 1 L bolus normal saline due to shock and start intravenous pressors with Levophed and Oh-Synephrine to keep map >65.    Replace potassium aggressively.    We will discuss with neurosurgery if we can hold mannitol due to volume depletion, shock and severe hyponatremia    Keppra for seizure prophylaxis    SCD for DVT prophylaxis    Prognosis appears to be poor.  Obviously not brain dead now as mentioned above but she may be progressing into that.  Will discuss care with the family.    Tg Linda MD  04/03/20  07:59      Time: Critical care 41 min      This note was dictated utilizing Dragon dictation

## 2020-04-03 NOTE — ANESTHESIA POSTPROCEDURE EVALUATION
"Patient: Denisse Reyes    Procedure Summary     Date:  04/02/20 Room / Location:  [facility] OR 21 / [facility] MAIN OR    Anesthesia Start:  1438 Anesthesia Stop:  1526    Procedure:  PLACEMENT OF EXTERNAL VENTRICULAR DRAIN (N/A Head) Diagnosis:      Surgeon:  Gavin Munson MD Provider:  Dayron Hoskins MD    Anesthesia Type:  general ASA Status:  3 - Emergent          Anesthesia Type: general    Vitals  Vitals Value Taken Time   BP 90/47 4/2/2020  4:30 PM   Temp 36.3 °C (97.4 °F) 4/2/2020  3:24 PM   Pulse 78 4/2/2020  4:36 PM   Resp 12 4/2/2020  4:15 PM   SpO2 99 % 4/2/2020  4:36 PM   Vitals shown include unvalidated device data.        Post Anesthesia Care and Evaluation      Comments: Patient discharged before being evaluated by an Anesthesiologist. No apparent complications per the record.  This case was not medically directed. I am completing this chart for medical records purposes; I personally have no medical involvement with this patient.    BP (!) 84/61   Pulse 90   Temp 36.9 °C (98.5 °F) (Rectal)   Resp 12   Ht 154.9 cm (61\")   Wt 63.6 kg (140 lb 3.2 oz)   SpO2 96%   BMI 26.49 kg/m²           "

## 2020-04-04 NOTE — DISCHARGE SUMMARY
"Admission date: 2020  Discharge date: 4/3/2020    Discharge condition: Patient .    Discharge diagnosis:  1. Ruptured Left anterior communicating artery aneurysm s/p coiling 20   2. Unruptured Left Superior Hypophyseal Aneurysm  3. Acute subarachnoid hemorrhage and intraventricular hemorrhage, secondary to #1  4. Hydrocephalus and elevated intracranial pressure, s/p EVD 2020  5. Respiratory failure due to inability to protect the airways, on mechanical ventilation  6. Coma  7. Hypothermia  8. Polyuria, suspect related to mannitol.  She may have also developed central DI  9. Shock: Secondary to volume depletion from polyuria and likely central element   10. Hypertensive emergency on admission, resolved  11. Severe Hypokalemia   12. Severe hypernatremia  13. Mild leukocytosis, likely stress-induced  14. Anemia       HPI: Per Dr. Zavala  54-year-old female who has no significant known medical history.  At about 4 PM today developed nausea with vomiting and headache.  She went to lay down.  After couple of hours she woke up with worsening symptoms.  Subsequently she lost consciousness.  EMS was called and in the meantime family initiated CPR.  Please note it is not clear that the patient had respiratory or cardiac arrest.  Family is unsure and was simply scared and initiated CPR.  Subsequent to the \"CPR\" she woke up.  She stated that she felt better for a few minutes and thereafter started having nausea and vomiting.  By this time EMS arrived.  She was brought to the hospital.  She continued to have vomiting and was intubated for airway protection.  Initial blood pressure recorded is 190/100.  CT head shows subarachnoid hemorrhage.  She has been evaluated by neurosurgery.  Initial plans was for a ventricular drain but this is now been deferred until evaluation by CT head in the morning.  Patient has a history of a sister dying of hemorrhagic bleed.  Also possibly patient's mother  of a hemorrhagic " bleed.  Unknown if these were aneurysmal bleeds or subarachnoid hemorrhages.     Patient does not have any history of fever or chills.  No reported cough.  Family states there has been no recent travel by the patient or close contacts.  Patient has been self isolating since January    Course in the hospital:  Patient underwent cerebral angiogram on 4/2/2020 which showed 2 aneurysm.  Coiling was attempted.  More hemorrhage occurred.  Intracranial pressure increased and she had hydrocephalus and intraventricular hemorrhage and underwent EVD placement on the same day..  She was treated for intracranial hypertension with mannitol.  Unfortunately condition continues to deteriorate and she went into a deep coma with lack of brainstem reflexes.  She had very poor prognosis for neurological recovery.  Condition was discussed with the family and it was decided to terminally extubate her.  She passed away comfortably next to her family

## 2020-04-05 LAB
BACTERIA SPEC AEROBE CULT: NORMAL
GRAM STN SPEC: NORMAL

## 2020-04-06 NOTE — PAYOR COMM NOTE
"Denisse Currie (Dcsd. Female)                  ATTENTION;   DC SUMMARY CASE REF # [id number]        Date of Birth Social Security Number Address Home Phone MRN    1965  [id number]  [address]  [facility] [id number] [phone] [id number]    Alevism Marital Status          None Single       Admission Date Admission Type Admitting Provider Attending Provider Department, Room/Bed    20 Emergency Radhames Zavala MD  [facility], [address]/    Discharge Date Discharge Disposition Discharge Destination        4/3/2020  in Medical Facility              Attending Provider:  (none)   Allergies:  No Known Allergies    Isolation:  None   Infection:  None   Code Status:  Prior    Ht:  154.9 cm (61\")   Wt:  63.6 kg (140 lb 3.2 oz)    Admission Cmt:  None   Principal Problem:  SAH (subarachnoid hemorrhage) (CMS/[facility]) [I60.9] More...                 Active Insurance as of 2020     Primary Coverage     Payor Plan Insurance Group Employer/Plan Group    [place] [id number]     Payor Plan Address Payor Plan Phone Number Payor Plan Fax Number Effective Dates    [employer] Claims Office - [address] [address] [phone]  2020 - None Entered    [facility] [id number]       Subscriber Name Subscriber Birth Date Member ID       DENISSE CURRIE 1965 [id number]                 Emergency Contacts          No emergency contacts on file.               Discharge Summary      Tg Linda MD at 20 1900        Admission date: 2020  Discharge date: 4/3/2020    Discharge condition: Patient .    Discharge diagnosis:  1. Ruptured Left anterior communicating artery aneurysm s/p coiling 20   2. Unruptured Left Superior Hypophyseal Aneurysm  3. Acute subarachnoid hemorrhage and intraventricular hemorrhage, secondary to #1  4. Hydrocephalus and elevated intracranial pressure, s/p EVD 2020  5. Respiratory failure due to inability to protect the airways, on " "mechanical ventilation  6. Coma  7. Hypothermia  8. Polyuria, suspect related to mannitol.  She may have also developed central DI  9. Shock: Secondary to volume depletion from polyuria and likely central element   10. Hypertensive emergency on admission, resolved  11. Severe Hypokalemia   12. Severe hypernatremia  13. Mild leukocytosis, likely stress-induced  14. Anemia       HPI: Per Dr. Zavala  54-year-old female who has no significant known medical history.  At about 4 PM today developed nausea with vomiting and headache.  She went to lay down.  After couple of hours she woke up with worsening symptoms.  Subsequently she lost consciousness.  EMS was called and in the meantime family initiated CPR.  Please note it is not clear that the patient had respiratory or cardiac arrest.  Family is unsure and was simply scared and initiated CPR.  Subsequent to the \"CPR\" she woke up.  She stated that she felt better for a few minutes and thereafter started having nausea and vomiting.  By this time EMS arrived.  She was brought to the hospital.  She continued to have vomiting and was intubated for airway protection.  Initial blood pressure recorded is 190/100.  CT head shows subarachnoid hemorrhage.  She has been evaluated by neurosurgery.  Initial plans was for a ventricular drain but this is now been deferred until evaluation by CT head in the morning.  Patient has a history of a sister dying of hemorrhagic bleed.  Also possibly patient's mother  of a hemorrhagic bleed.  Unknown if these were aneurysmal bleeds or subarachnoid hemorrhages.     Patient does not have any history of fever or chills.  No reported cough.  Family states there has been no recent travel by the patient or close contacts.  Patient has been self isolating since January    Course in the hospital:  Patient underwent cerebral angiogram on 2020 which showed 2 aneurysm.  Coiling was attempted.  More hemorrhage occurred.  Intracranial pressure " increased and she had hydrocephalus and intraventricular hemorrhage and underwent EVD placement on the same day..  She was treated for intracranial hypertension with mannitol.  Unfortunately condition continues to deteriorate and she went into a deep coma with lack of brainstem reflexes.  She had very poor prognosis for neurological recovery.  Condition was discussed with the family and it was decided to terminally extubate her.  She passed away comfortably next to her family    Electronically signed by Tg Linda MD at 04/04/20 2471

## 2020-04-07 LAB — CREAT BLDA-MCNC: 0.5 MG/DL (ref 0.6–1.3)

## 2022-10-27 NOTE — ANESTHESIA PREPROCEDURE EVALUATION
Anesthesia Evaluation     Patient summary reviewed   NPO Solid Status: > 8 hours  NPO Liquid Status: > 8 hours           Airway   Dental      Pulmonary    Cardiovascular         Neuro/Psych    ROS Comment: Ruptured cerebral anerysm  GI/Hepatic/Renal/Endo      Musculoskeletal     Abdominal    Substance History      OB/GYN          Other                        Anesthesia Plan    ASA 4 - emergent     general   (Patient arrived to OR intubated,ventilate,sedated and unresposive.)  intravenous induction     Anesthetic plan, all risks, benefits, and alternatives have been provided, discussed and informed consent has been obtained with: patient.       Information: Selecting Yes will display possible errors in your note based on the variables you have selected. This validation is only offered as a suggestion for you. PLEASE NOTE THAT THE VALIDATION TEXT WILL BE REMOVED WHEN YOU FINALIZE YOUR NOTE. IF YOU WANT TO FAX A PRELIMINARY NOTE YOU WILL NEED TO TOGGLE THIS TO 'NO' IF YOU DO NOT WANT IT IN YOUR FAXED NOTE.

## (undated) DEVICE — MANIFLD BLCK 200PSI 2PORT OFF RT

## (undated) DEVICE — INJECTOR SYR FOR DYE 1CC

## (undated) DEVICE — RADIFOCUS TORQUE DEVICE MULTI-TORQUE VISE: Brand: RADIFOCUS TORQUE DEVICE

## (undated) DEVICE — ADAPT Y ROT GATEWAY PLS

## (undated) DEVICE — PINNACLE INTRODUCER SHEATH: Brand: PINNACLE

## (undated) DEVICE — Device

## (undated) DEVICE — CATH 26020 EDM VENT 2.6X4.9 TRANSLUC

## (undated) DEVICE — GLV SURG SENSICARE W/ALOE PF LF 7.5 STRL

## (undated) DEVICE — SMOKE EVACUATION TUBING WITH 7/8 IN TO 1/4 IN REDUCER: Brand: BUFFALO FILTER

## (undated) DEVICE — SOL NACL 0.9PCT 1000ML

## (undated) DEVICE — 0.2 MICRON INTRAVENOUS FILTER FOR 96 HOUR USE WITH MICRO-BORE EXTENSION TUBING AN LUER-LOCK ADAPTER: Brand: PALL POSIDYNE® ELD FILTER

## (undated) DEVICE — DISPOSABLE IRRIGATION BIPOLAR CORD, M1000 TYPE: Brand: KIRWAN

## (undated) DEVICE — SYR MEDALLION LL PLUNGR/WHT 3ML

## (undated) DEVICE — CVR PROB 96IN LF STRL

## (undated) DEVICE — CODMAN® SURGICAL PATTIES 3/4" X 3/4" (1.91CM X 1.91CM): Brand: CODMAN®

## (undated) DEVICE — SYSTEM 46705 EXACTA DRAINAGE 100ML: Brand: EXACTA™

## (undated) DEVICE — CATH TEMPO 5F VER 135 Â° 100CM: Brand: TEMPO

## (undated) DEVICE — BG WAST DISPOSABLE DEPOT W/TBG48IN S/COCK SPK1400

## (undated) DEVICE — STPCK 3WY D201 DISCOFIX

## (undated) DEVICE — CATH MIC PROW/SELCT LPES 2TP 45D .0165 150

## (undated) DEVICE — CONTAINER,SPECIMEN,OR STERILE,4OZ: Brand: MEDLINE

## (undated) DEVICE — GLV SURG SENSICARE PI MIC PF SZ7.5 LF STRL

## (undated) DEVICE — PK NEURO SPINE 40

## (undated) DEVICE — GLV SURG BIOGEL LTX PF 6 1/2

## (undated) DEVICE — PINNACLE R/O II INTRODUCER SHEATH WITH RADIOPAQUE MARKER: Brand: PINNACLE

## (undated) DEVICE — DRSNG TELFA PAD NONADH STR 1S 3X4IN

## (undated) DEVICE — CATH GUIDE ENVOY XB MPC 6F .070IN 90CM

## (undated) DEVICE — ANTIBACTERIAL UNDYED BRAIDED (POLYGLACTIN 910), SYNTHETIC ABSORBABLE SUTURE: Brand: COATED VICRYL

## (undated) DEVICE — BASN GW RINGMASTER

## (undated) DEVICE — 3M™ IOBAN™ 2 ANTIMICROBIAL INCISE DRAPE 6650EZ: Brand: IOBAN™ 2

## (undated) DEVICE — ST ACC MICROPUNCTURE STFF .018 ECHO/PLDM/TP 4F/10CM 21G/7CM

## (undated) DEVICE — SOL IRR PHYSIOSOL BTL 1000ML

## (undated) DEVICE — SCANLAN® SUTURE BOOT™ INSTRUMENT JAW COVERS - ORIGINAL YELLOW, STANDARD PKG (5 PAIR/CARTRIDGE, 1 CARTRIDGE/PKG): Brand: SCANLAN® SUTURE BOOT™ INSTRUMENT JAW COVERS

## (undated) DEVICE — SYR CONTRL LUERLOK 10CC

## (undated) DEVICE — GUIDEWIRES: Brand: TRAXCESS GUIDEWIRE

## (undated) DEVICE — CODMAN® DISPOSABLE SPLIT TROCAR: Brand: CODMAN®

## (undated) DEVICE — IMPLANTABLE DEVICE
Type: IMPLANTABLE DEVICE | Site: CEREBRUM | Status: NON-FUNCTIONAL
Removed: 2020-04-02

## (undated) DEVICE — RADIFOCUS GLIDEWIRE: Brand: GLIDEWIRE

## (undated) DEVICE — KIT 46156 VENTRICULOSTOMY STANDARD: Brand: VENTRICULOSTOMY KIT

## (undated) DEVICE — PERFORATOR CDM WO/ DURAGUARD 14/11

## (undated) DEVICE — TOWEL,OR,DSP,ST,BLUE,STD,4/PK,20PK/CS: Brand: MEDLINE

## (undated) DEVICE — GLV SURG BIOGEL LTX PF 7

## (undated) DEVICE — CONN TBG Y 5 IN 1 LF STRL

## (undated) DEVICE — BIOPATCH™ ANTIMICROBIAL DRESSING WITH CHLORHEXIDINE GLUCONATE IS A HYDROPHILLIC POLYURETHANE ABSORPTIVE FOAM WITH CHLORHEXIDINE GLUCONATE (CHG) WHICH INHIBITS BACTERIAL GROWTH UNDER THE DRESSING. THE DRESSING IS INTENDED TO BE USED TO ABSORB EXUDATE, COVER A WOUND CAUSED BY VASCULAR AND NONVASCULAR PERCUTANEOUS MEDICAL DEVICES DURING SURGERY, AS WELL AS REDUCE LOCAL INFECTION AND COLONIZATION OF MICROORGANISMS.: Brand: BIOPATCH

## (undated) DEVICE — STANDARD GUIDEWIRE WITH HYDROPHILIC COATING: Brand: SYNCHRO 2

## (undated) DEVICE — GW AMPLTZ SUPERSTIFF STR .035IN 260CM

## (undated) DEVICE — NDL HYPO PRECISIONGLIDE REG 20G 1 1/2

## (undated) DEVICE — APPL CHLORAPREP HI/LITE 26ML ORNG

## (undated) DEVICE — TUBING, SUCTION, 1/4" X 20', STRAIGHT: Brand: MEDLINE INDUSTRIES, INC.

## (undated) DEVICE — IMPLANTABLE DEVICE: Type: IMPLANTABLE DEVICE | Site: CEREBRUM | Status: NON-FUNCTIONAL

## (undated) DEVICE — BLUNT CANNULA: Brand: MONOJECT

## (undated) DEVICE — DRSNG SURESITE WNDW 4X4.5

## (undated) DEVICE — PK ANGIO CERBRL RAD 40